# Patient Record
Sex: FEMALE | Race: WHITE | NOT HISPANIC OR LATINO | Employment: OTHER | URBAN - METROPOLITAN AREA
[De-identification: names, ages, dates, MRNs, and addresses within clinical notes are randomized per-mention and may not be internally consistent; named-entity substitution may affect disease eponyms.]

---

## 2017-01-18 ENCOUNTER — OFFICE VISIT (OUTPATIENT)
Dept: URGENT CARE | Facility: CLINIC | Age: 57
End: 2017-01-18
Payer: COMMERCIAL

## 2017-01-18 VITALS
TEMPERATURE: 98.1 F | WEIGHT: 190 LBS | BODY MASS INDEX: 32.44 KG/M2 | RESPIRATION RATE: 17 BRPM | DIASTOLIC BLOOD PRESSURE: 82 MMHG | SYSTOLIC BLOOD PRESSURE: 110 MMHG | HEART RATE: 95 BPM | OXYGEN SATURATION: 96 % | HEIGHT: 64 IN

## 2017-01-18 DIAGNOSIS — R05.9 COUGH: ICD-10-CM

## 2017-01-18 PROCEDURE — 99213 OFFICE O/P EST LOW 20 MIN: CPT | Performed by: EMERGENCY MEDICINE

## 2017-01-18 RX ORDER — DOXYCYCLINE HYCLATE 100 MG
100 TABLET ORAL 2 TIMES DAILY
Qty: 20 TAB | Refills: 0 | Status: SHIPPED | OUTPATIENT
Start: 2017-01-18 | End: 2017-03-12

## 2017-01-18 RX ORDER — CODEINE PHOSPHATE AND GUAIFENESIN 10; 100 MG/5ML; MG/5ML
5 SOLUTION ORAL EVERY 4 HOURS PRN
Qty: 200 ML | Refills: 0 | Status: SHIPPED | OUTPATIENT
Start: 2017-01-18 | End: 2017-03-12

## 2017-01-18 ASSESSMENT — ENCOUNTER SYMPTOMS
ABDOMINAL PAIN: 0
NAUSEA: 0
SPUTUM PRODUCTION: 0
COUGH: 1
NECK PAIN: 0
SENSORY CHANGE: 0
VOMITING: 0
CHILLS: 0
HEARTBURN: 0
FEVER: 0
SPEECH CHANGE: 0
SWEATS: 0
HEMOPTYSIS: 0
EYE REDNESS: 0
SHORTNESS OF BREATH: 0
EYE DISCHARGE: 0
EYE PAIN: 0
PALPITATIONS: 0
SORE THROAT: 0
MYALGIAS: 0
NERVOUS/ANXIOUS: 0

## 2017-01-18 NOTE — PROGRESS NOTES
Subjective:      Dinora Umana is a 56 y.o. female who presents with URI            Cough  This is a new problem. The current episode started more than 1 month ago. The problem has been waxing and waning. The problem occurs hourly. The cough is non-productive. Pertinent negatives include no chest pain, chills, ear pain, eye redness, fever, heartburn, hemoptysis, myalgias, nasal congestion, rash, sore throat, shortness of breath or sweats. Exacerbated by: worsen during the night. She has tried OTC cough suppressant for the symptoms. The treatment provided no relief. There is no history of asthma. patient recently stopped smoking about a month ago.     Allergies   Allergen Reactions   • Augmentin Hives and Rash   • Morphine Vomiting and Nausea     Social History     Social History   • Marital Status:      Spouse Name: N/A   • Number of Children: N/A   • Years of Education: N/A     Occupational History   • Not on file.     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 16 g 3   • zolpidem (AMBIEN CR) 12.5 MG CR tablet Take 1 Tab by mouth at bedtime as needed for Sleep. 30 Tab 2   • lorazepam (ATIVAN) 1 MG Tab Take 1 Tab by mouth 2 times a day as needed for Anxiety. 45 Tab 5   • fluoxetine  "(PROZAC) 40 MG capsule Take 1 Cap by mouth every day. 90 Cap 3   • Multiple Vitamins-Minerals (MULTIVITAMIN PO) Take  by mouth every day.       No current facility-administered medications on file prior to visit.     Family History   Problem Relation Age of Onset   • Stroke     • Cancer         Review of Systems   Constitutional: Negative for fever and chills.   HENT: Negative for ear pain and sore throat.    Eyes: Negative for pain, discharge and redness.   Respiratory: Positive for cough. Negative for hemoptysis, sputum production and shortness of breath.    Cardiovascular: Negative for chest pain and palpitations.   Gastrointestinal: Negative for heartburn, nausea, vomiting and abdominal pain.   Musculoskeletal: Negative for myalgias and neck pain.   Skin: Negative for rash.   Neurological: Negative for sensory change and speech change.   Psychiatric/Behavioral: The patient is not nervous/anxious.           Objective:     /82 mmHg  Pulse 95  Temp(Src) 36.7 °C (98.1 °F)  Resp 17  Ht 1.626 m (5' 4.02\")  Wt 86.183 kg (190 lb)  BMI 32.60 kg/m2  SpO2 96%     Physical Exam   Constitutional: She appears well-developed and well-nourished. No distress.   HENT:   Head: Normocephalic and atraumatic.   Right Ear: External ear normal.   Left Ear: External ear normal.   Eyes: Conjunctivae are normal. Right eye exhibits no discharge. Left eye exhibits no discharge.   Neck: Normal range of motion. Neck supple. No tracheal deviation present. No thyromegaly present.   Cardiovascular: Normal rate and regular rhythm.    Pulmonary/Chest: Effort normal. No respiratory distress. She has no wheezes. She has no rales. She exhibits no tenderness.   Abdominal: She exhibits no distension. There is no tenderness.   Musculoskeletal: Normal range of motion. She exhibits no edema or tenderness.   Neurological: No cranial nerve deficit. Coordination normal.   Skin: Skin is warm and dry. She is not diaphoretic.   Psychiatric: She has " a normal mood and affect. Her behavior is normal.   Vitals reviewed.              Assessment/Plan:     Diagnosis acute bronchitis      I am recommending the patient initiate/ continue hydration efforts including the use of a vaporizer/humidifier/ netti pot. I also recommend the pt, initiate Mucinex, Sudafed or Dayquil if not hypertensive. In addition the patient will initiate the prescribed prescription medication/s: Vibramycin. And Z-Calvin on a contingency basis. If the patient's condition exacerbates with worsening dysphagia, shortness of breath, uncontrolled fever, headache or chest pressure he/she will return immediately to the urgent care or go to  the emergency department for further evaluation.  She will start the Vibramycin sputum becomes purulent.  SMITA Kinney

## 2017-01-18 NOTE — Clinical Note
January 18, 2017        Dinora Umana  [address] [place] [place] [address]        Dear Dinora:    Please ask to be allowed to stay home from  work for the next two days,    If you have any questions or concerns, please don't hesitate to call.        Sincerely,        SMITA Kinney M.D.    Electronically Signed

## 2017-01-20 ENCOUNTER — OFFICE VISIT (OUTPATIENT)
Dept: URGENT CARE | Facility: CLINIC | Age: 57
End: 2017-01-20
Payer: COMMERCIAL

## 2017-01-20 VITALS
OXYGEN SATURATION: 98 % | HEIGHT: 64 IN | RESPIRATION RATE: 15 BRPM | DIASTOLIC BLOOD PRESSURE: 90 MMHG | SYSTOLIC BLOOD PRESSURE: 120 MMHG | BODY MASS INDEX: 32.44 KG/M2 | HEART RATE: 84 BPM | TEMPERATURE: 97.4 F | WEIGHT: 190 LBS

## 2017-01-20 DIAGNOSIS — J40 BRONCHITIS: Primary | ICD-10-CM

## 2017-01-20 DIAGNOSIS — R11.0 NAUSEA: ICD-10-CM

## 2017-01-20 DIAGNOSIS — J01.40 ACUTE NON-RECURRENT PANSINUSITIS: ICD-10-CM

## 2017-01-20 DIAGNOSIS — J06.9 URI WITH COUGH AND CONGESTION: ICD-10-CM

## 2017-01-20 PROCEDURE — 99214 OFFICE O/P EST MOD 30 MIN: CPT | Performed by: PHYSICIAN ASSISTANT

## 2017-01-20 RX ORDER — PROMETHAZINE HYDROCHLORIDE 25 MG/1
25 TABLET ORAL EVERY 6 HOURS PRN
Qty: 30 TAB | Refills: 0 | Status: SHIPPED | OUTPATIENT
Start: 2017-01-20 | End: 2018-02-23

## 2017-01-20 RX ORDER — BENZONATATE 200 MG/1
200 CAPSULE ORAL 3 TIMES DAILY PRN
Qty: 21 CAP | Refills: 0 | Status: SHIPPED | OUTPATIENT
Start: 2017-01-20 | End: 2017-01-27

## 2017-01-20 RX ORDER — SULFAMETHOXAZOLE AND TRIMETHOPRIM 800; 160 MG/1; MG/1
1 TABLET ORAL 2 TIMES DAILY
Qty: 20 TAB | Refills: 0 | Status: SHIPPED | OUTPATIENT
Start: 2017-01-20 | End: 2017-01-30

## 2017-01-20 ASSESSMENT — COPD QUESTIONNAIRES: COPD: 0

## 2017-01-20 ASSESSMENT — ENCOUNTER SYMPTOMS: COUGH: 1

## 2017-01-20 NOTE — PATIENT INSTRUCTIONS
Acute Bronchitis  Bronchitis is inflammation of the airways that extend from the windpipe into the lungs (bronchi). The inflammation often causes mucus to develop. This leads to a cough, which is the most common symptom of bronchitis.   In acute bronchitis, the condition usually develops suddenly and goes away over time, usually in a couple weeks. Smoking, allergies, and asthma can make bronchitis worse. Repeated episodes of bronchitis may cause further lung problems.   CAUSES  Acute bronchitis is most often caused by the same virus that causes a cold. The virus can spread from person to person (contagious) through coughing, sneezing, and touching contaminated objects.  SIGNS AND SYMPTOMS   · Cough.    · Fever.    · Coughing up mucus.    · Body aches.    · Chest congestion.    · Chills.    · Shortness of breath.    · Sore throat.    DIAGNOSIS   Acute bronchitis is usually diagnosed through a physical exam. Your health care provider will also ask you questions about your medical history. Tests, such as chest X-rays, are sometimes done to rule out other conditions.   TREATMENT   Acute bronchitis usually goes away in a couple weeks. Oftentimes, no medical treatment is necessary. Medicines are sometimes given for relief of fever or cough. Antibiotic medicines are usually not needed but may be prescribed in certain situations. In some cases, an inhaler may be recommended to help reduce shortness of breath and control the cough. A cool mist vaporizer may also be used to help thin bronchial secretions and make it easier to clear the chest.   HOME CARE INSTRUCTIONS  · Get plenty of rest.    · Drink enough fluids to keep your urine clear or pale yellow (unless you have a medical condition that requires fluid restriction). Increasing fluids may help thin your respiratory secretions (sputum) and reduce chest congestion, and it will prevent dehydration.    · Take medicines only as directed by your health care provider.  · If  you were prescribed an antibiotic medicine, finish it all even if you start to feel better.  · Avoid smoking and secondhand smoke. Exposure to cigarette smoke or irritating chemicals will make bronchitis worse. If you are a smoker, consider using nicotine gum or skin patches to help control withdrawal symptoms. Quitting smoking will help your lungs heal faster.    · Reduce the chances of another bout of acute bronchitis by washing your hands frequently, avoiding people with cold symptoms, and trying not to touch your hands to your mouth, nose, or eyes.    · Keep all follow-up visits as directed by your health care provider.    SEEK MEDICAL CARE IF:  Your symptoms do not improve after 1 week of treatment.   SEEK IMMEDIATE MEDICAL CARE IF:  · You develop an increased fever or chills.    · You have chest pain.    · You have severe shortness of breath.  · You have bloody sputum.    · You develop dehydration.  · You faint or repeatedly feel like you are going to pass out.  · You develop repeated vomiting.  · You develop a severe headache.  MAKE SURE YOU:   · Understand these instructions.  · Will watch your condition.  · Will get help right away if you are not doing well or get worse.     This information is not intended to replace advice given to you by your health care provider. Make sure you discuss any questions you have with your health care provider.     Document Released: 01/25/2006 Document Revised: 01/08/2016 Document Reviewed: 06/10/2014  [employer] Interactive Patient Education ©2016 [employer]

## 2017-01-20 NOTE — MR AVS SNAPSHOT
"Dinora Umana   2017 1:25 PM   Office Visit   MRN: [id number]    Department:  [facility]   Dept Phone:  [phone]    Description:  Female : 1960   Provider:  Dao Main PA-C           Reason for Visit     Cough           Allergies as of 2017     Allergen Noted Reactions    Augmentin 2008   Hives, Rash    Erythromycin 2017   Nausea    Morphine 2015   Vomiting, Nausea      You were diagnosed with     Bronchitis   [896081]  -  Primary     Acute non-recurrent pansinusitis   [[id number]]       URI with cough and congestion   [[id number]]       Nausea   [457556]         Vital Signs     Blood Pressure Pulse Temperature Respirations Height Weight    120/90 mmHg 84 36.3 °C (97.4 °F) 15 1.626 m (5' 4\") 86.183 kg (190 lb)    Body Mass Index Oxygen Saturation Smoking Status             32.60 kg/m2 98% Former Smoker         Basic Information     Date Of Birth Sex Race Ethnicity Preferred Language    1960 Female White Non- English      Problem List              ICD-10-CM Priority Class Noted - Resolved    MDD (major depressive disorder), recurrent episode, mild (CMS-[facility]) F33.0   Unknown - Present    Insomnia G47.00   Unknown - Present    Numbness and tingling in left hand R20.2   2014 - Present    Anxiety F41.9   2014 - Present    Recovering alcoholic in remission (CMS-[facility]) F10.21   2014 - Present    Seasonal allergies J30.2   2014 - Present    Hyperlipidemia E78.5   10/29/2014 - Present    Diverticulosis of colon K57.30   2014 - Present    Bilateral hearing loss H91.93   2015 - Present    Obesity (BMI 30-39.9) E66.9   2016 - Present      Health Maintenance        Date Due Completion Dates    MAMMOGRAM 2015, 2013, 2011, 2010, 2010, 2006    PAP SMEAR 2016, 2011    IMM DTaP/Tdap/Td Vaccine (2 - Td) 10/29/2024 10/29/2014, 2010    COLONOSCOPY 2024      "      Current Immunizations     Influenza TIV (IM) 1/21/2014, 11/23/2011    Influenza Vaccine Quad Inj (Preserved) 11/11/2016, 10/29/2014  8:47 AM    TD Vaccine 11/1/2010  8:30 PM    Tdap Vaccine 10/29/2014  9:13 AM      Below and/or attached are the medications your provider expects you to take. Review all of your home medications and newly ordered medications with your provider and/or pharmacist. Follow medication instructions as directed by your provider and/or pharmacist. Please keep your medication list with you and share with your provider. Update the information when medications are discontinued, doses are changed, or new medications (including over-the-counter products) are added; and carry medication information at all times in the event of emergency situations     Allergies:  AUGMENTIN - Hives,Rash     ERYTHROMYCIN - Nausea     MORPHINE - Vomiting,Nausea               Medications  Valid as of: January 20, 2017 -  5:34 PM    Generic Name Brand Name Tablet Size Instructions for use    Benzonatate (Cap) TESSALON 200 MG Take 1 Cap by mouth 3 times a day as needed for Cough for up to 7 days.        Doxycycline Hyclate (Tab) VIBRAMYCIN 100 MG Take 1 Tab by mouth 2 times a day.        Doxycycline Hyclate (Tab) VIBRAMYCIN 100 MG Take 1 Tab by mouth 2 times a day.        FLUoxetine HCl (Cap) PROZAC 40 MG Take 1 Cap by mouth every day.        Fluticasone Propionate (Suspension) FLONASE 50 MCG/ACT Spray 2 Sprays in nose every day.        Guaifenesin-Codeine (Solution) ROBITUSSIN -10 mg/5mL Take 5 mL by mouth every four hours as needed.        LORazepam (Tab) ATIVAN 1 MG Take 1 Tab by mouth 2 times a day as needed for Anxiety.        Multiple Vitamins-Minerals   Take  by mouth every day.        Promethazine HCl (Tab) PHENERGAN 25 MG Take 1 Tab by mouth every 6 hours as needed for Nausea/Vomiting.        Sulfamethoxazole-Trimethoprim (Tab) BACTRIM -160 MG Take 1 Tab by mouth 2 times a day for 10 days.         Zolpidem Tartrate (Tab CR) AMBIEN CR 12.5 MG Take 1 Tab by mouth at bedtime as needed for Sleep.        .                 Medicines prescribed today were sent to:     [id number] [place] [facility] [place] - [address]    [address] [place] [address]    Phone: [phone] Fax: [phone]    Open 24 Hours?: No      Medication refill instructions:       If your prescription bottle indicates you have medication refills left, it is not necessary to call your provider’s office. Please contact your pharmacy and they will refill your medication.    If your prescription bottle indicates you do not have any refills left, you may request refills at any time through one of the following ways: The online [employer] system (except Urgent Care), by calling your provider’s office, or by asking your pharmacy to contact your provider’s office with a refill request. Medication refills are processed only during regular business hours and may not be available until the next business day. Your provider may request additional information or to have a follow-up visit with you prior to refilling your medication.   *Please Note: Medication refills are assigned a new Rx number when refilled electronically. Your pharmacy may indicate that no refills were authorized even though a new prescription for the same medication is available at the pharmacy. Please request the medicine by name with the pharmacy before contacting your provider for a refill.        Instructions    Acute Bronchitis  Bronchitis is inflammation of the airways that extend from the windpipe into the lungs (bronchi). The inflammation often causes mucus to develop. This leads to a cough, which is the most common symptom of bronchitis.   In acute bronchitis, the condition usually develops suddenly and goes away over time, usually in a couple weeks. Smoking, allergies, and asthma can make bronchitis worse. Repeated episodes of bronchitis may cause further lung  problems.   CAUSES  Acute bronchitis is most often caused by the same virus that causes a cold. The virus can spread from person to person (contagious) through coughing, sneezing, and touching contaminated objects.  SIGNS AND SYMPTOMS   · Cough.    · Fever.    · Coughing up mucus.    · Body aches.    · Chest congestion.    · Chills.    · Shortness of breath.    · Sore throat.    DIAGNOSIS   Acute bronchitis is usually diagnosed through a physical exam. Your health care provider will also ask you questions about your medical history. Tests, such as chest X-rays, are sometimes done to rule out other conditions.   TREATMENT   Acute bronchitis usually goes away in a couple weeks. Oftentimes, no medical treatment is necessary. Medicines are sometimes given for relief of fever or cough. Antibiotic medicines are usually not needed but may be prescribed in certain situations. In some cases, an inhaler may be recommended to help reduce shortness of breath and control the cough. A cool mist vaporizer may also be used to help thin bronchial secretions and make it easier to clear the chest.   HOME CARE INSTRUCTIONS  · Get plenty of rest.    · Drink enough fluids to keep your urine clear or pale yellow (unless you have a medical condition that requires fluid restriction). Increasing fluids may help thin your respiratory secretions (sputum) and reduce chest congestion, and it will prevent dehydration.    · Take medicines only as directed by your health care provider.  · If you were prescribed an antibiotic medicine, finish it all even if you start to feel better.  · Avoid smoking and secondhand smoke. Exposure to cigarette smoke or irritating chemicals will make bronchitis worse. If you are a smoker, consider using nicotine gum or skin patches to help control withdrawal symptoms. Quitting smoking will help your lungs heal faster.    · Reduce the chances of another bout of acute bronchitis by washing your hands frequently,  avoiding people with cold symptoms, and trying not to touch your hands to your mouth, nose, or eyes.    · Keep all follow-up visits as directed by your health care provider.    SEEK MEDICAL CARE IF:  Your symptoms do not improve after 1 week of treatment.   SEEK IMMEDIATE MEDICAL CARE IF:  · You develop an increased fever or chills.    · You have chest pain.    · You have severe shortness of breath.  · You have bloody sputum.    · You develop dehydration.  · You faint or repeatedly feel like you are going to pass out.  · You develop repeated vomiting.  · You develop a severe headache.  MAKE SURE YOU:   · Understand these instructions.  · Will watch your condition.  · Will get help right away if you are not doing well or get worse.     This information is not intended to replace advice given to you by your health care provider. Make sure you discuss any questions you have with your health care provider.     Document Released: 01/25/2006 Document Revised: 01/08/2016 Document Reviewed: 06/10/2014  [employer] Interactive Patient Education ©2016 [employer] Inc.            ValetAnywhere Access Code: [email or web address]  Expires: 2/10/2017  3:34 PM    ValetAnywhere  A secure, online tool to manage your health information     [employer]’s ValetAnywhere® is a secure, online tool that connects you to your personalized health information from the privacy of your home -- day or night - making it very easy for you to manage your healthcare. Once the activation process is completed, you can even access your medical information using the ValetAnywhere joshua, which is available for free in the Apple Joshua store or Google Play store.     ValetAnywhere provides the following levels of access (as shown below):   My Chart Features   [facility] Doctor [facility]  Specialists [facility]  Urgent  Care [facility]  Primary Care  Doctor   Email your healthcare team securely and privately 24/7 X X X    Manage appointments: schedule your next appointment; view details of  past/upcoming appointments X      Request prescription refills. X      View recent personal medical records, including lab and immunizations X X X X   View health record, including health history, allergies, medications X X X X   Read reports about your outpatient visits, procedures, consult and ER notes X X X X   See your discharge summary, which is a recap of your hospital and/or ER visit that includes your diagnosis, lab results, and care plan. X X       How to register for [employer]:  1. Go to  [email or web address].  2. Click on the Sign Up Now box, which takes you to the New Member Sign Up page. You will need to provide the following information:  a. Enter your [employer] Access Code exactly as it appears at the top of this page. (You will not need to use this code after you’ve completed the sign-up process. If you do not sign up before the expiration date, you must request a new code.)   b. Enter your date of birth.   c. Enter your home email address.   d. Click Submit, and follow the next screen’s instructions.  3. Create a [employer] ID. This will be your [employer] login ID and cannot be changed, so think of one that is secure and easy to remember.  4. Create a [employer] password. You can change your password at any time.  5. Enter your Password Reset Question and Answer. This can be used at a later time if you forget your password.   6. Enter your e-mail address. This allows you to receive e-mail notifications when new information is available in [employer].  7. Click Sign Up. You can now view your health information.    For assistance activating your [employer] account, call [phone]

## 2017-01-20 NOTE — Clinical Note
January 20, 2017       Patient: Dinora Umana   YOB: 1960   Date of Visit: 1/20/2017         To Whom It May Concern:    It is my medical opinion that Dinora Umana may be excused from   work for the date of 1/20/17.      If you have any questions or concerns, please don't hesitate to call [phone]          Sincerely,          Dao Main PA-C  Electronically Signed

## 2017-01-21 NOTE — PROGRESS NOTES
Subjective:      Dinora Umana is a 56 y.o. female who presents with Cough            Cough  This is a new problem. The current episode started in the past 7 days. The problem has been gradually worsening. The problem occurs constantly. The cough is productive of purulent sputum. The symptoms are aggravated by cold air, exercise and lying down. She has tried OTC cough suppressant for the symptoms. The treatment provided no relief. Her past medical history is significant for bronchitis. There is no history of asthma, bronchiectasis, COPD or emphysema.   PT presents to  clinic today complaining of sore throat, fevers, chills, watery eyes, pressure in ears, cough, fatigue, runny nose, wheezing and SOB and mild nausea. PT denies CP, diarrhea, abdominal pain, joint pain. PT states these symptoms began around 7 days ago and that the pt's family has been sick on and off for the last week. PT states the pain is a 7/10 with coughing, aching in nature and worse at night. Pt has not taken any medications for this condition. The pt's medication list, problem list, and allergies have been evaluated and reviewed during today's visit.    PMH:  Past Medical History   Diagnosis Date   • Insomnia    • Allergy    • Depression    • Ex-smoker    • Alcohol abuse    • Left wrist fracture 2013   • Thyrotoxicosis      Graves' disease / TR ab +, resolved       PSH:  Past Surgical History   Procedure Laterality Date   • Other       eye surgery   • Primary c section  1999, 2000   • Wrist orif  8/1/2013     Left Performed by Madina Mccartney M.D. at [facility]   • Mammoplasty reduction Bilateral 9/29/2015     Procedure: MAMMOPLASTY REDUCTION;  Surgeon: Sharon Srinivasan M.D.;  Location: [facility];  Service:        [facility] Hx:    family history includes Cancer in an other family member; Stroke in an other family member.  Family Status   Relation Status Death Age   • Mother Alive    • Father Alive        Soc  HX:  Social History     Social History   • Marital Status:      Spouse Name: N/A   • Number of Children: N/A   • Years of Education: N/A     Occupational History   • Not on file.     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, teaching     Other Topics Concern   • Not on file     Social History Narrative         Medications:    Current outpatient prescriptions:   •  sulfamethoxazole-trimethoprim (BACTRIM DS) 800-160 MG tablet, Take 1 Tab by mouth 2 times a day for 10 days., Disp: 20 Tab, Rfl: 0  •  benzonatate (TESSALON) 200 MG capsule, Take 1 Cap by mouth 3 times a day as needed for Cough for up to 7 days., Disp: 21 Cap, Rfl: 0  •  promethazine (PHENERGAN) 25 MG Tab, Take 1 Tab by mouth every 6 hours as needed for Nausea/Vomiting., Disp: 30 Tab, Rfl: 0  •  zolpidem (AMBIEN CR) 12.5 MG CR tablet, Take 1 Tab by mouth at bedtime as needed for Sleep., Disp: 30 Tab, Rfl: 2  •  lorazepam (ATIVAN) 1 MG Tab, Take 1 Tab by mouth 2 times a day as needed for Anxiety., Disp: 45 Tab, Rfl: 5  •  fluoxetine (PROZAC) 40 MG capsule, Take 1 Cap by mouth every day., Disp: 90 Cap, Rfl: 3  •  Multiple Vitamins-Minerals (MULTIVITAMIN PO), Take  by mouth every day., Disp: , Rfl:   •  guaifenesin-codeine (CHERATUSSIN AC) Solution oral solution, Take 5 mL by mouth every four hours as needed., Disp: 200 mL, Rfl: 0  •  doxycycline (VIBRAMYCIN) 100 MG Tab, Take 1 Tab by mouth 2 times a day., Disp: 20 Tab, Rfl: 0  •  doxycycline (VIBRAMYCIN) 100 MG Tab, Take 1 Tab by mouth 2 times a day., Disp: 20 Tab, Rfl: 0  •  fluticasone (FLONASE) 50 MCG/ACT nasal spray, Spray 2 Sprays in nose every day., Disp: 16 g, Rfl: 3      Allergies:  Augmentin; Erythromycin; and  "Morphine        Review of Systems   Respiratory: Positive for cough.    Review of Systems   Constitutional: Positive for chills and malaise/fatigue. Negative for fever and diaphoresis.   HENT: Positive for congestion, ear pain and sore throat. Negative for ear discharge, hearing loss, nosebleeds and tinnitus.    Eyes: Negative for blurred vision, double vision and photophobia.   Respiratory continued: Positive for cough, sputum production, shortness of breath and wheezing. Negative for hemoptysis.    Cardiovascular: Negative for chest pain and palpitations.   Gastrointestinal: POS for nausea, NEG for vomiting, abdominal pain, diarrhea and constipation.   Genitourinary: Negative for dysuria and flank pain.   Musculoskeletal: Negative for joint pain and myalgias.   Skin: Negative for itching and rash.   Neurological: Positive for headaches. Negative for dizziness, tingling and weakness.   Endo/Heme/Allergies: Does not bruise/bleed easily.   Psychiatric/Behavioral: Negative for depression. The patient is not nervous/anxious.    All other systems reviewed and are negative.           Objective:     /90 mmHg  Pulse 84  Temp(Src) 36.3 °C (97.4 °F)  Resp 15  Ht 1.626 m (5' 4\")  Wt 86.183 kg (190 lb)  BMI 32.60 kg/m2  SpO2 98%     Physical Exam      Physical Exam   Constitutional: PT is oriented to person, place, and time. PT appears well-developed and well-nourished. No distress.   HENT:   Head: Normocephalic and atraumatic.   Right Ear: Hearing, tympanic membrane, external ear and ear canal normal.   Left Ear: Hearing, tympanic membrane, external ear and ear canal normal.   Nose: Mucosal edema, rhinorrhea and sinus tenderness present. Right sinus exhibits frontal sinus tenderness. Left sinus exhibits frontal sinus tenderness.   Mouth/Throat: Uvula is midline. Mucous membranes are pale. Posterior oropharyngeal edema and posterior oropharyngeal erythema present. No oropharyngeal exudate.   Eyes: Conjunctivae " normal and EOM are normal. Pupils are equal, round, and reactive to light. Right eye exhibits no discharge. Left eye exhibits no discharge.   Neck: Normal range of motion. Neck supple. No thyromegaly present.   Cardiovascular: Normal rate, regular rhythm, normal heart sounds and intact distal pulses.  Exam reveals no gallop and no friction rub.    No murmur heard.  Pulmonary/Chest: Effort normal. No respiratory distress. PT has wheezes. PT has no rales. PT exhibits tenderness.   Abdominal: Soft. Bowel sounds are normal. PT exhibits no distension and no mass. There is no tenderness. There is no rebound and no guarding.   Musculoskeletal: Normal range of motion. PT exhibits no edema and no tenderness.   Lymphadenopathy:     PT has no cervical adenopathy.   Neurological: Pt is alert and oriented to person, place, and time. Pt has normal reflexes. No cranial nerve deficit.   Skin: Skin is warm and dry. No rash noted. No erythema.   Psychiatric: PT has a normal mood and affect. Pt behavior is normal. Judgment and thought content normal.          Assessment/Plan:     1. Bronchitis    - sulfamethoxazole-trimethoprim (BACTRIM DS) 800-160 MG tablet; Take 1 Tab by mouth 2 times a day for 10 days.  Dispense: 20 Tab; Refill: 0    2. Acute non-recurrent pansinusitis    - sulfamethoxazole-trimethoprim (BACTRIM DS) 800-160 MG tablet; Take 1 Tab by mouth 2 times a day for 10 days.  Dispense: 20 Tab; Refill: 0    3. URI with cough and congestion    - benzonatate (TESSALON) 200 MG capsule; Take 1 Cap by mouth 3 times a day as needed for Cough for up to 7 days.  Dispense: 21 Cap; Refill: 0    4. Nausea    - promethazine (PHENERGAN) 25 MG Tab; Take 1 Tab by mouth every 6 hours as needed for Nausea/Vomiting.  Dispense: 30 Tab; Refill: 0    Rest, fluids encouraged.  OTC decongestant for congestion/cough  Note given for work.  AVS with medical info given.  Pt was in full understanding and agreement with the plan.  Follow-up as needed if  symptoms worsen or fail to improve.

## 2017-02-08 ENCOUNTER — RX ONLY (OUTPATIENT)
Age: 57
Setting detail: RX ONLY
End: 2017-02-08

## 2017-02-22 ENCOUNTER — OFFICE VISIT (OUTPATIENT)
Dept: URGENT CARE | Facility: CLINIC | Age: 57
End: 2017-02-22
Payer: COMMERCIAL

## 2017-02-22 VITALS
TEMPERATURE: 97.5 F | RESPIRATION RATE: 20 BRPM | OXYGEN SATURATION: 96 % | HEART RATE: 100 BPM | BODY MASS INDEX: 32.6 KG/M2 | SYSTOLIC BLOOD PRESSURE: 120 MMHG | DIASTOLIC BLOOD PRESSURE: 80 MMHG | WEIGHT: 190 LBS

## 2017-02-22 DIAGNOSIS — M25.521 RIGHT ELBOW PAIN: ICD-10-CM

## 2017-02-22 PROCEDURE — 99214 OFFICE O/P EST MOD 30 MIN: CPT | Performed by: PHYSICIAN ASSISTANT

## 2017-02-22 RX ORDER — MELOXICAM 15 MG/1
15 TABLET ORAL DAILY
Qty: 30 TAB | Refills: 0 | Status: SHIPPED | OUTPATIENT
Start: 2017-02-22 | End: 2018-02-23

## 2017-02-22 ASSESSMENT — ENCOUNTER SYMPTOMS
SENSORY CHANGE: 0
SHORTNESS OF BREATH: 0
CHILLS: 0
PALPITATIONS: 0
PAIN: 1
FALLS: 0
TINGLING: 0
JOINT SWELLING: 1
WHEEZING: 0
FEVER: 0
COUGH: 0

## 2017-02-22 NOTE — PROGRESS NOTES
Subjective:      Dinora Umana is a 56 y.o. female who presents with Pain            Pain  This is a new problem. The current episode started more than 1 month ago. The problem occurs daily. The problem has been waxing and waning. Associated symptoms include joint swelling. Pertinent negatives include no chest pain, chills, coughing or fever. She has tried nothing for the symptoms.   Right elbow pain off and on for the past several months. She denies any precipitating events or injuries. Patient states she is ambidextrous. She has not taken anything for the condition. She states it is worse with grasping and elbow flexion.    PMH:  has a past medical history of Insomnia; Allergy; Depression; Ex-smoker; Alcohol abuse; Left wrist fracture (2013); and Thyrotoxicosis.  MEDS:   Current outpatient prescriptions:   •  fluoxetine (PROZAC) 40 MG capsule, Take 1 Cap by mouth every day., Disp: 90 Cap, Rfl: 3  •  Multiple Vitamins-Minerals (MULTIVITAMIN PO), Take  by mouth every day., Disp: , Rfl:   •  promethazine (PHENERGAN) 25 MG Tab, Take 1 Tab by mouth every 6 hours as needed for Nausea/Vomiting., Disp: 30 Tab, Rfl: 0  •  guaifenesin-codeine (CHERATUSSIN AC) Solution oral solution, Take 5 mL by mouth every four hours as needed., Disp: 200 mL, Rfl: 0  •  doxycycline (VIBRAMYCIN) 100 MG Tab, Take 1 Tab by mouth 2 times a day., Disp: 20 Tab, Rfl: 0  •  doxycycline (VIBRAMYCIN) 100 MG Tab, Take 1 Tab by mouth 2 times a day., Disp: 20 Tab, Rfl: 0  •  fluticasone (FLONASE) 50 MCG/ACT nasal spray, Spray 2 Sprays in nose every day., Disp: 16 g, Rfl: 3  •  zolpidem (AMBIEN CR) 12.5 MG CR tablet, Take 1 Tab by mouth at bedtime as needed for Sleep., Disp: 30 Tab, Rfl: 2  •  lorazepam (ATIVAN) 1 MG Tab, Take 1 Tab by mouth 2 times a day as needed for Anxiety., Disp: 45 Tab, Rfl: 5  ALLERGIES:   Allergies   Allergen Reactions   • Augmentin Hives and Rash   • Erythromycin Nausea   • Morphine Vomiting and Nausea     SURGHX:   Past  Surgical History   Procedure Laterality Date   • Other       eye surgery   • Primary c section  1999, 2000   • Wrist orif  8/1/2013     Left Performed by Madina Mccartney M.D. at [facility]   • Mammoplasty reduction Bilateral 9/29/2015     Procedure: MAMMOPLASTY REDUCTION;  Surgeon: Sharon Srinivasan M.D.;  Location: [facility]ERY [facility];  Service:      SOCHX:  reports that she quit smoking about 17 months ago. She has never used smokeless tobacco. She reports that she drinks alcohol. She reports that she does not use illicit drugs.  FH: family history includes Cancer in an other family member; Stroke in an other family member.    Review of Systems   Constitutional: Negative for fever, chills and malaise/fatigue.   Respiratory: Negative for cough, shortness of breath and wheezing.    Cardiovascular: Negative for chest pain, palpitations and leg swelling.   Musculoskeletal: Positive for joint pain (Right elbow) and joint swelling. Negative for falls.   Neurological: Negative for tingling and sensory change.       Medications, Allergies, and current problem list reviewed today in Epic       Objective:     /80 mmHg  Pulse 100  Temp(Src) 36.4 °C (97.5 °F)  Resp 20  Wt 86.183 kg (190 lb)  SpO2 96%     Physical Exam   Constitutional: She is oriented to person, place, and time. She appears well-developed and well-nourished. No distress.   HENT:   Head: Normocephalic and atraumatic.   Eyes: Conjunctivae and EOM are normal.   Neck: Normal range of motion. Neck supple.   Cardiovascular: Normal rate, regular rhythm and normal heart sounds.    Pulmonary/Chest: Effort normal and breath sounds normal. No respiratory distress. She has no wheezes.   Musculoskeletal:        Right elbow: She exhibits swelling. She exhibits normal range of motion. Tenderness found. Medial epicondyle and lateral epicondyle tenderness noted.    strength equal however painful and right forearm muscles.  Neurovascularly intact.   Neurological: She is alert and oriented to person, place, and time.   Skin: Skin is warm and dry. She is not diaphoretic.   Psychiatric: She has a normal mood and affect. Her behavior is normal. Judgment and thought content normal.   Nursing note and vitals reviewed.              Assessment/Plan:     1. Right elbow pain  meloxicam (MOBIC) 15 MG tablet    REFERRAL TO ORTHOPEDICS     Chronic right elbow pain. Possible lateral epicondylitis.  Trial of meloxicam  Rest, ice, compression, elevation  If no improvement in 2-3 weeks follow-up with orthopedics. Referral placed  Return to clinic or go to ED if symptoms worsen or persist. Indications for ED discussed at length. Patient voices understanding. Follow-up with your primary care provider in 3-5 days. Red flags discussed.    Please note that this dictation was created using voice recognition software. I have made every reasonable attempt to correct obvious errors, but I expect that there are errors of grammar and possibly content that I did not discover before finalizing the note.

## 2017-02-22 NOTE — MR AVS SNAPSHOT
Dinora Preciadoer   2017 12:00 PM   Office Visit   MRN: [id number]    Department:  [facility]   Dept Phone:  [phone]    Description:  Female : 1960   Provider:  Nigel Thomas PA-C           Reason for Visit     Pain While ago left elbow pain .      Allergies as of 2017     Allergen Noted Reactions    Augmentin 2008   Hives, Rash    Erythromycin 2017   Nausea    Morphine 2015   Vomiting, Nausea      You were diagnosed with     Right elbow pain   [[id number]]         Vital Signs     Blood Pressure Pulse Temperature Respirations Weight Oxygen Saturation    120/80 mmHg 100 36.4 °C (97.5 °F) 20 86.183 kg (190 lb) 96%    Smoking Status                   Former Smoker           Basic Information     Date Of Birth Sex Race Ethnicity Preferred Language    1960 Female White Non- English      Problem List              ICD-10-CM Priority Class Noted - Resolved    MDD (major depressive disorder), recurrent episode, mild (CMS-[facility]) F33.0   Unknown - Present    Insomnia G47.00   Unknown - Present    Numbness and tingling in left hand R20.2   2014 - Present    Anxiety F41.9   2014 - Present    Recovering alcoholic in remission (CMS-[facility]) F10.21   2014 - Present    Seasonal allergies J30.2   2014 - Present    Hyperlipidemia E78.5   10/29/2014 - Present    Diverticulosis of colon K57.30   2014 - Present    Bilateral hearing loss H91.93   2015 - Present    Obesity (BMI 30-39.9) E66.9   2016 - Present      Health Maintenance        Date Due Completion Dates    MAMMOGRAM 2015, 2013, 2011, 2010, 2010, 2006    PAP SMEAR 2016, 2011    IMM DTaP/Tdap/Td Vaccine (2 - Td) 10/29/2024 10/29/2014, 2010    COLONOSCOPY 2024            Current Immunizations     Influenza TIV (IM) 2014, 2011    Influenza Vaccine Quad Inj (Preserved) 2016, 10/29/2014  8:47 AM       TD Vaccine 11/1/2010  8:30 PM    Tdap Vaccine 10/29/2014  9:13 AM      Below and/or attached are the medications your provider expects you to take. Review all of your home medications and newly ordered medications with your provider and/or pharmacist. Follow medication instructions as directed by your provider and/or pharmacist. Please keep your medication list with you and share with your provider. Update the information when medications are discontinued, doses are changed, or new medications (including over-the-counter products) are added; and carry medication information at all times in the event of emergency situations     Allergies:  AUGMENTIN - Hives,Rash     ERYTHROMYCIN - Nausea     MORPHINE - Vomiting,Nausea               Medications  Valid as of: February 22, 2017 - 12:35 PM    Generic Name Brand Name Tablet Size Instructions for use    Doxycycline Hyclate (Tab) VIBRAMYCIN 100 MG Take 1 Tab by mouth 2 times a day.        Doxycycline Hyclate (Tab) VIBRAMYCIN 100 MG Take 1 Tab by mouth 2 times a day.        FLUoxetine HCl (Cap) PROZAC 40 MG Take 1 Cap by mouth every day.        Fluticasone Propionate (Suspension) FLONASE 50 MCG/ACT Spray 2 Sprays in nose every day.        Guaifenesin-Codeine (Solution) ROBITUSSIN -10 mg/5mL Take 5 mL by mouth every four hours as needed.        LORazepam (Tab) ATIVAN 1 MG Take 1 Tab by mouth 2 times a day as needed for Anxiety.        Meloxicam (Tab) MOBIC 15 MG Take 1 Tab by mouth every day.        Multiple Vitamins-Minerals   Take  by mouth every day.        Promethazine HCl (Tab) PHENERGAN 25 MG Take 1 Tab by mouth every 6 hours as needed for Nausea/Vomiting.        Zolpidem Tartrate (Tab CR) AMBIEN CR 12.5 MG Take 1 Tab by mouth at bedtime as needed for Sleep.        .                 Medicines prescribed today were sent to:     [id number] [place] [facility] [place] [address] [facility]    [address] [facility] [place] [address]    Phone: [phone] Fax:  [phone]    Open 24 Hours?: No      Medication refill instructions:       If your prescription bottle indicates you have medication refills left, it is not necessary to call your provider’s office. Please contact your pharmacy and they will refill your medication.    If your prescription bottle indicates you do not have any refills left, you may request refills at any time through one of the following ways: The online Satellogic system (except Urgent Care), by calling your provider’s office, or by asking your pharmacy to contact your provider’s office with a refill request. Medication refills are processed only during regular business hours and may not be available until the next business day. Your provider may request additional information or to have a follow-up visit with you prior to refilling your medication.   *Please Note: Medication refills are assigned a new Rx number when refilled electronically. Your pharmacy may indicate that no refills were authorized even though a new prescription for the same medication is available at the pharmacy. Please request the medicine by name with the pharmacy before contacting your provider for a refill.        Referral     A referral request has been sent to our patient care coordination department. Please allow 3-5 business days for us to process this request and contact you either by phone or mail. If you do not hear from us by the 5th business day, please call us at [phone].           Satellogic Access Code: [email or web address]  Expires: 3/11/2017  2:16 PM    Satellogic  A secure, online tool to manage your health information     [employer]’s Satellogic® is a secure, online tool that connects you to your personalized health information from the privacy of your home -- day or night - making it very easy for you to manage your healthcare. Once the activation process is completed, you can even access your medical information using the Satellogic kali, which is available for  free in the Apple Joshua store or Google Play store.     [employer] provides the following levels of access (as shown below):   My Chart Features   [facility] Primary [facility] Doctor [facility]  Specialists [facility]  Urgent  Care Non-[facility]  Primary Care  Doctor   Email your healthcare team securely and privately 24/7 X X X    Manage appointments: schedule your next appointment; view details of past/upcoming appointments X      Request prescription refills. X      View recent personal medical records, including lab and immunizations X X X X   View health record, including health history, allergies, medications X X X X   Read reports about your outpatient visits, procedures, consult and ER notes X X X X   See your discharge summary, which is a recap of your hospital and/or ER visit that includes your diagnosis, lab results, and care plan. X X       How to register for [employer]:  1. Go to  [email or web address].  2. Click on the Sign Up Now box, which takes you to the New Member Sign Up page. You will need to provide the following information:  a. Enter your [employer] Access Code exactly as it appears at the top of this page. (You will not need to use this code after you’ve completed the sign-up process. If you do not sign up before the expiration date, you must request a new code.)   b. Enter your date of birth.   c. Enter your home email address.   d. Click Submit, and follow the next screen’s instructions.  3. Create a [employer] ID. This will be your [employer] login ID and cannot be changed, so think of one that is secure and easy to remember.  4. Create a [employer] password. You can change your password at any time.  5. Enter your Password Reset Question and Answer. This can be used at a later time if you forget your password.   6. Enter your e-mail address. This allows you to receive e-mail notifications when new information is available in [employer].  7. Click Sign Up. You can now view your health information.    For assistance  activating your [employer] account, call [phone]

## 2017-02-24 ENCOUNTER — HOSPITAL ENCOUNTER (EMERGENCY)
Facility: MEDICAL CENTER | Age: 57
End: 2017-02-24
Attending: EMERGENCY MEDICINE
Payer: COMMERCIAL

## 2017-02-24 ENCOUNTER — APPOINTMENT (OUTPATIENT)
Dept: RADIOLOGY | Facility: MEDICAL CENTER | Age: 57
End: 2017-02-24
Attending: EMERGENCY MEDICINE
Payer: COMMERCIAL

## 2017-02-24 VITALS
HEART RATE: 99 BPM | OXYGEN SATURATION: 92 % | BODY MASS INDEX: 33.12 KG/M2 | SYSTOLIC BLOOD PRESSURE: 135 MMHG | DIASTOLIC BLOOD PRESSURE: 82 MMHG | HEIGHT: 63 IN | TEMPERATURE: 98.4 F | WEIGHT: 186.95 LBS | RESPIRATION RATE: 16 BRPM

## 2017-02-24 DIAGNOSIS — S92.414A CLOSED NONDISPLACED FRACTURE OF PROXIMAL PHALANX OF RIGHT GREAT TOE, INITIAL ENCOUNTER: ICD-10-CM

## 2017-02-24 DIAGNOSIS — S00.01XA SCALP ABRASION, INITIAL ENCOUNTER: ICD-10-CM

## 2017-02-24 PROCEDURE — 99284 EMERGENCY DEPT VISIT MOD MDM: CPT

## 2017-02-24 PROCEDURE — 700111 HCHG RX REV CODE 636 W/ 250 OVERRIDE (IP): Performed by: EMERGENCY MEDICINE

## 2017-02-24 PROCEDURE — 73660 X-RAY EXAM OF TOE(S): CPT | Mod: RT

## 2017-02-24 RX ORDER — ONDANSETRON 4 MG/1
4 TABLET, ORALLY DISINTEGRATING ORAL ONCE
Status: COMPLETED | OUTPATIENT
Start: 2017-02-24 | End: 2017-02-24

## 2017-02-24 RX ADMIN — ONDANSETRON 4 MG: 4 TABLET, ORALLY DISINTEGRATING ORAL at 20:28

## 2017-02-24 ASSESSMENT — PAIN SCALES - GENERAL
PAINLEVEL_OUTOF10: 7
PAINLEVEL_OUTOF10: 7

## 2017-02-24 NOTE — ED AVS SNAPSHOT
Simphatic Access Code: [email or web address]  Expires: 3/11/2017  2:16 PM    Simphatic  A secure, online tool to manage your health information     [employer]’s Simphatic® is a secure, online tool that connects you to your personalized health information from the privacy of your home -- day or night - making it very easy for you to manage your healthcare. Once the activation process is completed, you can even access your medical information using the Simphatic joshua, which is available for free in the Apple Joshua store or Google Play store.     Simphatic provides the following levels of access (as shown below):   My Chart Features   [facility] Primary Care Doctor [facility]  Specialists [facility]  Urgent  Care Non-[facility]  Primary Care  Doctor   Email your healthcare team securely and privately 24/7 X X X X   Manage appointments: schedule your next appointment; view details of past/upcoming appointments X      Request prescription refills. X      View recent personal medical records, including lab and immunizations X X X X   View health record, including health history, allergies, medications X X X X   Read reports about your outpatient visits, procedures, consult and ER notes X X X X   See your discharge summary, which is a recap of your hospital and/or ER visit that includes your diagnosis, lab results, and care plan. X X       How to register for Simphatic:  1. Go to  [email or web address].  2. Click on the Sign Up Now box, which takes you to the New Member Sign Up page. You will need to provide the following information:  a. Enter your Simphatic Access Code exactly as it appears at the top of this page. (You will not need to use this code after you’ve completed the sign-up process. If you do not sign up before the expiration date, you must request a new code.)   b. Enter your date of birth.   c. Enter your home email address.   d. Click Submit, and follow the next screen’s instructions.  3. Create a Simphatic ID. This will be your Simphatic  login ID and cannot be changed, so think of one that is secure and easy to remember.  4. Create a Qiro password. You can change your password at any time.  5. Enter your Password Reset Question and Answer. This can be used at a later time if you forget your password.   6. Enter your e-mail address. This allows you to receive e-mail notifications when new information is available in [employer]iro.  7. Click Sign Up. You can now view your health information.    For assistance activating your [employer] account, call [phone]

## 2017-02-24 NOTE — ED AVS SNAPSHOT
2/24/2017          Dinora Umana  [address] [place] [address]    Dear Dinora:    [facility] wants to ensure your discharge home is safe and you or your loved ones have had all your questions answered regarding your care after you leave the hospital.    You may receive a telephone call within two days of your discharge.  This call is to make certain you understand your discharge instructions as well as ensure we provided you with the best care possible during your stay with us.     The call will only last approximately 3-5 minutes and will be done by a nurse.    Once again, we want to ensure your discharge home is safe and that you have a clear understanding of any next steps in your care.  If you have any questions or concerns, please do not hesitate to contact us, we are here for you.  Thank you for choosing [facility] for your healthcare needs.    Sincerely,    Joel Galicia    [facility]

## 2017-02-24 NOTE — ED AVS SNAPSHOT
Home Care Instructions                                                                                                                Dinora Umana   MRN: [id number]    Department:  [facility], Emergency Dept   Date of Visit:  2/24/2017            [facility], Emergency Dept    [address]    [place] [address]    Phone:  [phone]      You were seen by     Clarence Keith M.D.      Your Diagnosis Was     Scalp abrasion, initial encounter     S00.01XA       These are the medications you received during your hospitalization from 02/24/2017 1752 to 02/24/2017 2117     Date/Time Order Dose Route Action    02/24/2017 2028 ondansetron (ZOFRAN ODT) dispertab 4 mg 4 mg Oral Given      Follow-up Information     1. Schedule an appointment as soon as possible for a visit with Reagan Liu M.D..    Specialty:  Orthopaedics    Why:  Call Summer at [phone] to schedule a follow up appointment.     Contact information    [address]  [address]  [place] [address]  [phone]        Medication Information     Review all of your home medications and newly ordered medications with your primary doctor and/or pharmacist as soon as possible. Follow medication instructions as directed by your doctor and/or pharmacist.     Please keep your complete medication list with you and share with your physician. Update the information when medications are discontinued, doses are changed, or new medications (including over-the-counter products) are added; and carry medication information at all times in the event of emergency situations.               Medication List      ASK your doctor about these medications        Instructions    * doxycycline 100 MG Tabs   Commonly known as:  VIBRAMYCIN    Take 1 Tab by mouth 2 times a day.   Dose:  100 mg       * doxycycline 100 MG Tabs   Commonly known as:  VIBRAMYCIN    Doctor's comments:  Start if your sputum becomes  productive/. Purulent.   Take 1 Tab by mouth 2 times a day.   Dose:  100 mg       fluoxetine 40 MG capsule   Commonly known as:  PROZAC    Take 1 Cap by mouth every day.   Dose:  40 mg       fluticasone 50 MCG/ACT nasal spray   Commonly known as:  FLONASE    Spray 2 Sprays in nose every day.   Dose:  2 Spray       guaifenesin-codeine Soln oral solution   Commonly known as:  CHERATUSSIN AC    Take 5 mL by mouth every four hours as needed.   Dose:  5 mL       lorazepam 1 MG Tabs   Commonly known as:  ATIVAN    Take 1 Tab by mouth 2 times a day as needed for Anxiety.   Dose:  1 mg       meloxicam 15 MG tablet   Commonly known as:  MOBIC    Take 1 Tab by mouth every day.   Dose:  15 mg       MULTIVITAMIN PO    Take  by mouth every day.       promethazine 25 MG Tabs   Commonly known as:  PHENERGAN    Take 1 Tab by mouth every 6 hours as needed for Nausea/Vomiting.   Dose:  25 mg       zolpidem 12.5 MG CR tablet   Commonly known as:  AMBIEN CR    Doctor's comments:  Hold on file   Take 1 Tab by mouth at bedtime as needed for Sleep.   Dose:  12.5 mg       * Notice:  This list has 2 medication(s) that are the same as other medications prescribed for you. Read the directions carefully, and ask your doctor or other care provider to review them with you.            Procedures and tests performed during your visit     DX-TOE(S) 2+ RIGHT    SPLINT APPLICATION        Discharge Instructions       Foot Fracture  Your caregiver has diagnosed you as having a foot fracture (broken bone). Your foot has many bones. You have a fracture, or break, in one of these bones. In some cases, your doctor may put on a splint or removable fracture boot until the swelling in your foot has lessened. A cast may or may not be required.  HOME CARE INSTRUCTIONS   If you do not have a cast or splint:  · You may bear weight on your injured foot as tolerated or advised.   · Do not put any weight on your injured foot for as long as directed by your  caregiver. Slowly increase the amount of time you walk on the foot as the pain and swelling allows or as advised.   · Use crutches until you can bear weight without pain. A gradual increase in weight bearing may help.   · Apply ice to the injury for 15-20 minutes each hour while awake for the first 2 days. Put the ice in a plastic bag and place a towel between the bag of ice and your skin.   · If an ace bandage (stretchy, elastic wrapping bandage) was applied, you may re-wrap it if ankle is more painful or your toes become cold and swollen.   If you have a cast or splint:  · Use your crutches for as long as directed by your caregiver.   · To lessen the swelling, keep the injured foot elevated on pillows while lying down or sitting. Elevate your foot above your heart.   · Apply ice to the injury for 15-20 minutes each hour while awake for the first 2 days. Put the ice in a plastic bag and place a thin towel between the bag of ice and your cast.   · Plaster or fiberglass cast:   · Do not try to scratch the skin under the cast using a sharp or pointed object down the cast.   · Check the skin around the cast every day. You may put lotion on any red or sore areas.   · Keep your cast clean and dry.   · Plaster splint:   · Wear the splint until you are seen for a follow-up examination.   · You may loosen the elastic around the splint if your toes become numb, tingle, or turn blue or cold. Do not rest it on anything harder than a pillow in the first 24 hours.   · Do not put pressure on any part of your splint. Use your crutches as directed.   · Keep your splint dry. It can be protected during bathing with a plastic bag. Do not lower the splint into water.   · If you have a fracture boot you may remove it to shower. Bear weight only as instructed by your caregiver.   · Only take over-the-counter or prescription medicines for pain, discomfort, or fever as directed by your caregiver.   SEEK IMMEDIATE MEDICAL CARE IF:   · Your  cast gets damaged or breaks.   · You have continued severe pain or more swelling than you did before the cast was put on.   · Your skin or nails of your casted foot turn blue, gray, feel cold or numb.   · There is a bad smell from your cast.   · There is severe pain with movement of your toes.   · There are new stains and/or drainage coming from under the cast.   MAKE SURE YOU:   · Understand these instructions.   · Will watch your condition.   · Will get help right away if you are not doing well or get worse.   Document Released: 12/15/2001 Document Revised: 03/11/2013 Document Reviewed: 01/21/2010  ExitCare® Patient Information ©2013 [employer].          Patient Information     Patient Information    Following emergency treatment: all patient requiring follow-up care must return either to a private physician or a clinic if your condition worsens before you are able to obtain further medical attention, please return to the emergency room.     Billing Information    At [facility], we work to make the billing process streamlined for our patients.  Our Representatives are here to answer any questions you may have regarding your hospital bill.  If you have insurance coverage and have supplied your insurance information to us, we will submit a claim to your insurer on your behalf.  Should you have any questions regarding your bill, we can be reached online or by phone as follows:  Online: You are able pay your bills online or live chat with our representatives about any billing questions you may have. We are here to help Monday - Friday from 8:00am to 7:30pm and 9:00am - 12:00pm on Saturdays.  Please visit [email or web address]  for more information.   Phone:  [phone] or [phone]    Please note that your emergency physician, surgeon, pathologist, radiologist, anesthesiologist, and other specialists are not employed by [facility] and will therefore bill separately for their  services.  Please contact them directly for any questions concerning their bills at the numbers below:     Emergency Physician Services:  [phone]  [facility]:  [phone]  [facility]:  [phone]  [facility]:  [phone]    1. Your final bill may vary from the amount quoted upon discharge if all procedures are not complete at that time, or if your doctor has additional procedures of which we are not aware. You will receive an additional bill if you return to the Emergency Department at [facility] for suture removal regardless of the facility of which the sutures were placed.     2. Please arrange for settlement of this account at the emergency registration.    3. All self-pay accounts are due in full at the time of treatment.  If you are unable to meet this obligation then payment is expected within 4-5 days.     4. If you have had radiology studies (CT, X-ray, Ultrasound, MRI), you have received a preliminary result during your emergency department visit. Please contact the radiology department [phone] to receive a copy of your final result. Please discuss the Final result with your primary physician or with the follow up physician provided.     Crisis Hotline:  [place] Crisis Hotline:  1-277-EXRKFJZ or 1-706.360.8394  [place] Crisis Hotline:    1-938.480.8901 or 940-249-6951         ED Discharge Follow Up Questions    1. In order to provide you with very good care, we would like to follow up with a phone call in the next few days.  May we have your permission to contact you?     YES /  NO    2. What is the best phone number to call you? (       )_____-__________    3. What is the best time to call you?      Morning  /  Afternoon  /  Evening                   Patient Signature:  ____________________________________________________________    Date:  ____________________________________________________________

## 2017-02-25 NOTE — ED NOTES
"Chief Complaint   Patient presents with   • Fall     fell over while reaching for a glass of water   • Head Injury     hit head on marble table, no LOC     /82 mmHg  Pulse 97  Temp(Src) 36.9 °C (98.4 °F)  Resp 16  Ht 1.6 m (5' 3\")  Wt 84.8 kg (186 lb 15.2 oz)  BMI 33.13 kg/m2  SpO2 96%    "

## 2017-02-25 NOTE — ED PROVIDER NOTES
ED Provider Note    CHIEF COMPLAINT  Chief Complaint   Patient presents with   • Fall     fell over while reaching for a glass of water   • Head Injury     hit head on marble table, no LOC       HPI  Dinora Umana is a 56 y.o. female who presents for evaluation of scalp and right big toe pain. The patient states that she took a nap this afternoon after waking at approximately 445 this afternoon, the patient got out of bed, tripped on her right big toe, causing the toe to fold underneath her right foot. The patient states that she impacted the top of her scalp against a marble countertop, and sustained pain to the top of her head. The patient denies associated loss of consciousness, but states that she has had some edema underneath the area of impact at the top of her scalp over this period of time, and secondary to this, was concerned that she may have a serious head injury. Secondary to this and ongoing right toe pain which has not impacted her ability to ambulate, the patient decided to present here for further evaluation.    REVIEW OF SYSTEMS  See HPI for further details. All other systems are negative.     PAST MEDICAL HISTORY   has a past medical history of Insomnia; Allergy; Depression; Ex-smoker; Alcohol abuse; Left wrist fracture (2013); and Thyrotoxicosis.    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    CURRENT MEDICATIONS  Home Medications     **Home medications have not yet been reviewed for this encounter**     "      ALLERGIES  Allergies   Allergen Reactions   • Augmentin Hives and Rash   • Erythromycin Nausea   • Morphine Vomiting and Nausea       PHYSICAL EXAM  VITAL SIGNS: /82 mmHg  Pulse 97  Temp(Src) 36.9 °C (98.4 °F)  Resp 16  Ht 1.6 m (5' 3\")  Wt 84.8 kg (186 lb 15.2 oz)  BMI 33.13 kg/m2  SpO2 96%   Pulse ox interpretation: I interpret this pulse ox as normal.  Constitutional: Alert in no apparent distress.  HENT: Normocephalic, 6 cm x 4 cm area over the top of the scalp with abrasion overlying, and a slight hematoma underneath. There is no palpable skull deficits noted, no lacerations, Bilateral external ears normal. Nose normal.   Eyes: Pupils are equal and reactive. Conjunctiva normal, non-icteric.   Heart: Regular rate and rythm.  Lungs: No audible wheezing, no increased work of breathing, no accessory muscle use.  Abdomen: Soft, non-distended, non-tender   Skin: Warm, Dry, No erythema, No rash.   Extremities: Right big toe with some tenderness to palpation throughout the digit, full range of motion however to both passive and active range, is fully intact. Cap refills less than 2 seconds, and sensation is intact.  Neurologic: Alert, Grossly non-focal.   Psychiatric: Affect normal, Judgment normal, Mood normal, appears alert and not intoxicated.     DX-TOE(S) 2+ RIGHT   Final Result      1.  Minimally displaced comminuted intra-articular fracture of the RIGHT 1st distal phalanx.   2.  No dislocation.          COURSE & MEDICAL DECISION MAKING  Pertinent Labs & Imaging studies reviewed. (See chart for details)    Patient presenting here for evaluation of scalp pain and right big toe pain. Here on initial examination the patient has an abrasion with underlying hematoma at the top of the scalp. The patient is not on any blood thinners, and is not old enough to have significant risk factors for shearing forces causing subdural hemorrhage. Additionally, the patient has no headache, or other associated " symptoms suggestive of serious intracranial hemorrhage. The patient has some mild tenderness to palpation of the right big toe and x-ray shows evidence of a minimally displaced comminuted intra-articular fracture of the right 1st distal phalanx. Given this, she likely has a abrasion over the top of her scalp, and 1st toe fracture. I spoke with Dr. Deleon, on-call orthopedics, who stated that the patient would do well in a postsurgical shoe, and that he would be happy to follow her up in his clinic in 1 week.    The patient will not drink alcohol nor drive with prescribed medications. The patient will return for worsening symptoms and is stable at the time of discharge. The patient verbalizes understanding and will comply.    The patient is referred to a primary physician for blood pressure management, diabetic screening, and for all other preventative health concerns, should they be present.    FINAL IMPRESSION  1. Scalp abrasion  2. Right 1st toe.  3.         Electronically signed by: Clarence Keith, 2/24/2017 7:49 PM      This record was made with a voice recognition software. I have tried to correct any grammar, spelling or context errors to the best of my ability, but errors may still remain. Interpretation of this chart should be taken in this context.

## 2017-02-25 NOTE — DISCHARGE INSTRUCTIONS
Foot Fracture  Your caregiver has diagnosed you as having a foot fracture (broken bone). Your foot has many bones. You have a fracture, or break, in one of these bones. In some cases, your doctor may put on a splint or removable fracture boot until the swelling in your foot has lessened. A cast may or may not be required.  HOME CARE INSTRUCTIONS   If you do not have a cast or splint:  · You may bear weight on your injured foot as tolerated or advised.   · Do not put any weight on your injured foot for as long as directed by your caregiver. Slowly increase the amount of time you walk on the foot as the pain and swelling allows or as advised.   · Use crutches until you can bear weight without pain. A gradual increase in weight bearing may help.   · Apply ice to the injury for 15-20 minutes each hour while awake for the first 2 days. Put the ice in a plastic bag and place a towel between the bag of ice and your skin.   · If an ace bandage (stretchy, elastic wrapping bandage) was applied, you may re-wrap it if ankle is more painful or your toes become cold and swollen.   If you have a cast or splint:  · Use your crutches for as long as directed by your caregiver.   · To lessen the swelling, keep the injured foot elevated on pillows while lying down or sitting. Elevate your foot above your heart.   · Apply ice to the injury for 15-20 minutes each hour while awake for the first 2 days. Put the ice in a plastic bag and place a thin towel between the bag of ice and your cast.   · Plaster or fiberglass cast:   · Do not try to scratch the skin under the cast using a sharp or pointed object down the cast.   · Check the skin around the cast every day. You may put lotion on any red or sore areas.   · Keep your cast clean and dry.   · Plaster splint:   · Wear the splint until you are seen for a follow-up examination.   · You may loosen the elastic around the splint if your toes become numb, tingle, or turn blue or cold. Do not  rest it on anything harder than a pillow in the first 24 hours.   · Do not put pressure on any part of your splint. Use your crutches as directed.   · Keep your splint dry. It can be protected during bathing with a plastic bag. Do not lower the splint into water.   · If you have a fracture boot you may remove it to shower. Bear weight only as instructed by your caregiver.   · Only take over-the-counter or prescription medicines for pain, discomfort, or fever as directed by your caregiver.   SEEK IMMEDIATE MEDICAL CARE IF:   · Your cast gets damaged or breaks.   · You have continued severe pain or more swelling than you did before the cast was put on.   · Your skin or nails of your casted foot turn blue, gray, feel cold or numb.   · There is a bad smell from your cast.   · There is severe pain with movement of your toes.   · There are new stains and/or drainage coming from under the cast.   MAKE SURE YOU:   · Understand these instructions.   · Will watch your condition.   · Will get help right away if you are not doing well or get worse.   Document Released: 12/15/2001 Document Revised: 03/11/2013 Document Reviewed: 01/21/2010  ExitCare® Patient Information ©2013 [employer].

## 2017-03-12 ENCOUNTER — OFFICE VISIT (OUTPATIENT)
Dept: URGENT CARE | Facility: CLINIC | Age: 57
End: 2017-03-12
Payer: COMMERCIAL

## 2017-03-12 VITALS
HEART RATE: 102 BPM | BODY MASS INDEX: 31.99 KG/M2 | DIASTOLIC BLOOD PRESSURE: 74 MMHG | WEIGHT: 187.4 LBS | SYSTOLIC BLOOD PRESSURE: 110 MMHG | TEMPERATURE: 97.9 F | RESPIRATION RATE: 18 BRPM | HEIGHT: 64 IN | OXYGEN SATURATION: 97 %

## 2017-03-12 DIAGNOSIS — J34.89 NASAL VESTIBULITIS: ICD-10-CM

## 2017-03-12 PROCEDURE — 99214 OFFICE O/P EST MOD 30 MIN: CPT | Performed by: FAMILY MEDICINE

## 2017-03-12 RX ORDER — SULFAMETHOXAZOLE AND TRIMETHOPRIM 800; 160 MG/1; MG/1
TABLET ORAL
Qty: 20 TAB | Refills: 0 | Status: SHIPPED | OUTPATIENT
Start: 2017-03-12 | End: 2018-02-23

## 2017-03-12 NOTE — MR AVS SNAPSHOT
"Dinora Umana   3/12/2017 11:15 AM   Office Visit   MRN: [id number]    Department:  [facility]   Dept Phone:  [phone]    Description:  Female : 1960   Provider:  Tino Moreno M.D.           Reason for Visit     Facial Swelling (L) nostril, tender to touch      Allergies as of 3/12/2017     Allergen Noted Reactions    Augmentin 2008   Hives, Rash    Erythromycin 2017   Nausea    Morphine 2015   Vomiting, Nausea      You were diagnosed with     Nasal vestibulitis   [370036]         Vital Signs     Blood Pressure Pulse Temperature Respirations Height Weight    110/74 mmHg 102 36.6 °C (97.9 °F) 18 1.613 m (5' 3.5\") 85.004 kg (187 lb 6.4 oz)    Body Mass Index Oxygen Saturation Smoking Status             32.67 kg/m2 97% Former Smoker         Basic Information     Date Of Birth Sex Race Ethnicity Preferred Language    1960 Female White Non- English      Problem List              ICD-10-CM Priority Class Noted - Resolved    MDD (major depressive disorder), recurrent episode, mild (CMS-HCC) F33.0   Unknown - Present    Insomnia G47.00   Unknown - Present    Numbness and tingling in left hand R20.2   2014 - Present    Anxiety F41.9   2014 - Present    Recovering alcoholic in remission (CMS-HCC) F10.21   2014 - Present    Seasonal allergies J30.2   2014 - Present    Hyperlipidemia E78.5   10/29/2014 - Present    Diverticulosis of colon K57.30   2014 - Present    Bilateral hearing loss H91.93   2015 - Present    Obesity (BMI 30-39.9) E66.9   2016 - Present      Health Maintenance        Date Due Completion Dates    MAMMOGRAM 2015, 2013, 2011, 2010, 2010, 2006    PAP SMEAR 2016, 2011    IMM DTaP/Tdap/Td Vaccine (2 - Td) 10/29/2024 10/29/2014, 2010    COLONOSCOPY 2024 2014            Current Immunizations     Influenza TIV (IM) 2014, 2011   " Influenza Vaccine Quad Inj (Preserved) 11/11/2016, 10/29/2014  8:47 AM    TD Vaccine 11/1/2010  8:30 PM    Tdap Vaccine 10/29/2014  9:13 AM      Below and/or attached are the medications your provider expects you to take. Review all of your home medications and newly ordered medications with your provider and/or pharmacist. Follow medication instructions as directed by your provider and/or pharmacist. Please keep your medication list with you and share with your provider. Update the information when medications are discontinued, doses are changed, or new medications (including over-the-counter products) are added; and carry medication information at all times in the event of emergency situations     Allergies:  AUGMENTIN - Hives,Rash     ERYTHROMYCIN - Nausea     MORPHINE - Vomiting,Nausea               Medications  Valid as of: March 12, 2017 - 12:27 PM    Generic Name Brand Name Tablet Size Instructions for use    FLUoxetine HCl (Cap) PROZAC 40 MG Take 1 Cap by mouth every day.        Fluticasone Propionate (Suspension) FLONASE 50 MCG/ACT Spray 2 Sprays in nose every day.        LORazepam (Tab) ATIVAN 1 MG Take 1 Tab by mouth 2 times a day as needed for Anxiety.        Meloxicam (Tab) MOBIC 15 MG Take 1 Tab by mouth every day.        Multiple Vitamins-Minerals   Take  by mouth every day.        Mupirocin (Ointment) BACTROBAN 2 % APPLY TO SKIN INFECTION 3 TIMES A DAY UNTIL HEALED.        Promethazine HCl (Tab) PHENERGAN 25 MG Take 1 Tab by mouth every 6 hours as needed for Nausea/Vomiting.        Sulfamethoxazole-Trimethoprim (Tab) BACTRIM -160 MG 1 TAB TWICE A DAY X 10 DAYS.        Zolpidem Tartrate (Tab CR) AMBIEN CR 12.5 MG Take 1 Tab by mouth at bedtime as needed for Sleep.        .                 Medicines prescribed today were sent to:     [id number] [place] [facility] [place] - [address]    [address] [place] [address]    Phone: [phone] Fax: [phone]    Open 24 Hours?: No         Medication refill instructions:       If your prescription bottle indicates you have medication refills left, it is not necessary to call your provider’s office. Please contact your pharmacy and they will refill your medication.    If your prescription bottle indicates you do not have any refills left, you may request refills at any time through one of the following ways: The online Synchronized system (except Urgent Care), by calling your provider’s office, or by asking your pharmacy to contact your provider’s office with a refill request. Medication refills are processed only during regular business hours and may not be available until the next business day. Your provider may request additional information or to have a follow-up visit with you prior to refilling your medication.   *Please Note: Medication refills are assigned a new Rx number when refilled electronically. Your pharmacy may indicate that no refills were authorized even though a new prescription for the same medication is available at the pharmacy. Please request the medicine by name with the pharmacy before contacting your provider for a refill.           Synchronized Access Code: [email or web address]  Expires: 4/9/2017  2:39 PM    Synchronized  A secure, online tool to manage your health information     [employer]’s Synchronized® is a secure, online tool that connects you to your personalized health information from the privacy of your home -- day or night - making it very easy for you to manage your healthcare. Once the activation process is completed, you can even access your medical information using the Synchronized joshua, which is available for free in the Apple Joshua store or Google Play store.     Synchronized provides the following levels of access (as shown below):   My Chart Features   [facility] Doctor [facility]  Specialists [facility]  Urgent  Care Non-[facility]  Primary Care  Doctor   Email your healthcare team securely and privately 24/7 X X X    Manage appointments:  schedule your next appointment; view details of past/upcoming appointments X      Request prescription refills. X      View recent personal medical records, including lab and immunizations X X X X   View health record, including health history, allergies, medications X X X X   Read reports about your outpatient visits, procedures, consult and ER notes X X X X   See your discharge summary, which is a recap of your hospital and/or ER visit that includes your diagnosis, lab results, and care plan. X X       How to register for [facility]:  1. Go to  [email or web address].  2. Click on the Sign Up Now box, which takes you to the New Member Sign Up page. You will need to provide the following information:  a. Enter your [facility] Access Code exactly as it appears at the top of this page. (You will not need to use this code after you’ve completed the sign-up process. If you do not sign up before the expiration date, you must request a new code.)   b. Enter your date of birth.   c. Enter your home email address.   d. Click Submit, and follow the next screen’s instructions.  3. Create a Progressive Care ID. This will be your Progressive Care login ID and cannot be changed, so think of one that is secure and easy to remember.  4. Create a [facility] password. You can change your password at any time.  5. Enter your Password Reset Question and Answer. This can be used at a later time if you forget your password.   6. Enter your e-mail address. This allows you to receive e-mail notifications when new information is available in [facility].  7. Click Sign Up. You can now view your health information.    For assistance activating your [facility] account, call [phone]

## 2017-03-12 NOTE — PROGRESS NOTES
Chief Complaint:    Chief Complaint   Patient presents with   • Facial Swelling     (L) nostril, tender to touch       History of Present Illness:    This is a new problem. Symptoms x 3 days; has red, swollen, painful area just at opening of left nare, septal region, getting worse. Using hot compresses to area and tried to nelly with needle last night without help. Bactrim has worked/tolerated in past.      Review of Systems:    Constitutional: Negative for fever, chills, and diaphoresis.   Eyes: Negative for change in vision, photophobia, pain, redness, and discharge.  ENT: See HPI.    Respiratory: Negative for cough, hemoptysis, sputum production, shortness of breath, wheezing, and stridor.    Cardiovascular: Negative for chest pain, palpitations, orthopnea, claudication, leg swelling, and PND.   Gastrointestinal: Negative for abdominal pain, nausea, vomiting, diarrhea, constipation, blood in stool, and melena.   Genitourinary: Negative for dysuria, urinary urgency, urinary frequency, hematuria, and flank pain.   Musculoskeletal: Negative for myalgias, joint pain, neck pain, and back pain.   Skin: Negative for rash and itching.   Neurological: Negative for dizziness, tingling, tremors, sensory change, speech change, focal weakness, seizures, loss of consciousness, and headaches.   Endo: Negative for polydipsia.   Heme: Does not bruise/bleed easily.   Psychiatric/Behavioral: No new symptoms.    Past Medical History:    Past Medical History   Diagnosis Date   • Insomnia    • Allergy    • Depression    • Ex-smoker    • Alcohol abuse    • Left wrist fracture 2013   • Thyrotoxicosis      Graves' disease / TR ab +, resolved       Past Surgical History:    Past Surgical History   Procedure Laterality Date   • Other       eye surgery   • Primary c section  1999, 2000   • Wrist orif  8/1/2013     Left Performed by Madina Mccartney M.D. at [facility]   • Mammoplasty reduction Bilateral 9/29/2015      "Procedure: MAMMOPLASTY REDUCTION;  Surgeon: Sharon Srinivasan M.D.;  Location: SURGERY [facility];  Service:        Social History:    Social History     Social History   • Marital Status:      Spouse Name: N/A   • Number of Children: N/A   • Years of Education: N/A     Occupational History   • Not on file.     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 30 Tab 0   • promethazine (PHENERGAN) 25 MG Tab Take 1 Tab by mouth every 6 hours as needed for Nausea/Vomiting. 30 Tab 0   • fluticasone (FLONASE) 50 MCG/ACT nasal spray Spray 2 Sprays in nose every day. 16 g 3   • zolpidem (AMBIEN CR) 12.5 MG CR tablet Take 1 Tab by mouth at bedtime as needed for Sleep. 30 Tab 2   • lorazepam (ATIVAN) 1 MG Tab Take 1 Tab by mouth 2 times a day as needed for Anxiety. 45 Tab 5   • fluoxetine (PROZAC) 40 MG capsule Take 1 Cap by mouth every day. 90 Cap 3   • Multiple Vitamins-Minerals (MULTIVITAMIN PO) Take  by mouth every day.       No current facility-administered medications on file prior to visit.       Allergies:    Allergies   Allergen Reactions   • Augmentin Hives and Rash   • Erythromycin Nausea   • Morphine Vomiting and Nausea         Vitals:    Filed Vitals:    03/12/17 1123   BP: 110/74   Pulse: 102   Temp: 36.6 °C (97.9 °F)   Resp: 18   Height: 1.613 m (5' 3.5\")   Weight: 85.004 kg (187 lb 6.4 " oz)   SpO2: 97%       Physical Exam:    Constitutional: Vital signs reviewed. Appears well-developed and well-nourished. No acute distress.   Eyes: Sclera white, conjunctivae clear.    ENT: Left nare: just at opening, septal region, the skin is erythematous, swollen, and tender to palpation. Mild pustular appearance centrally with some scabbing (she tried to nelly with needle last night without help). External ears normal. Hearing normal.   Neck: Neck supple.   Pulmonary/Chest: Respirations non-labored.   Musculoskeletal: Normal gait. Normal range of motion. No muscular atrophy or weakness.  Neurological: Alert and oriented to person, place, and time. Muscle tone normal. Coordination normal.   Skin: No rashes or lesions. Warm, dry, normal turgor.  Psychiatric: Normal mood and affect. Behavior is normal. Judgment and thought content normal.       Assessment / Plan:    1. Nasal vestibulitis  - sulfamethoxazole-trimethoprim (BACTRIM DS) 800-160 MG tablet; 1 TAB TWICE A DAY X 10 DAYS.  Dispense: 20 Tab; Refill: 0  - mupirocin (BACTROBAN) 2 % Ointment; APPLY TO SKIN INFECTION 3 TIMES A DAY UNTIL HEALED.  Dispense: 22 g; Refill: 3      Discussed with her DDX and management options.    Agreeable to medications prescribed.    Follow-up with PCP or urgent care if getting worse or not better with above.

## 2017-05-03 ENCOUNTER — OFFICE VISIT (OUTPATIENT)
Dept: MEDICAL GROUP | Facility: MEDICAL CENTER | Age: 57
End: 2017-05-03
Payer: COMMERCIAL

## 2017-05-03 ENCOUNTER — APPOINTMENT (OUTPATIENT)
Dept: MEDICAL GROUP | Facility: MEDICAL CENTER | Age: 57
End: 2017-05-03
Payer: COMMERCIAL

## 2017-05-03 VITALS
HEART RATE: 71 BPM | SYSTOLIC BLOOD PRESSURE: 112 MMHG | HEIGHT: 64 IN | OXYGEN SATURATION: 97 % | WEIGHT: 186 LBS | BODY MASS INDEX: 31.76 KG/M2 | DIASTOLIC BLOOD PRESSURE: 80 MMHG | TEMPERATURE: 98 F

## 2017-05-03 DIAGNOSIS — J30.1 SEASONAL ALLERGIC RHINITIS DUE TO POLLEN: ICD-10-CM

## 2017-05-03 PROCEDURE — 99214 OFFICE O/P EST MOD 30 MIN: CPT | Performed by: FAMILY MEDICINE

## 2017-05-03 RX ORDER — TRIAMCINOLONE ACETONIDE 40 MG/ML
20 INJECTION, SUSPENSION INTRA-ARTICULAR; INTRAMUSCULAR ONCE
Status: COMPLETED | OUTPATIENT
Start: 2017-05-03 | End: 2017-05-03

## 2017-05-03 RX ADMIN — TRIAMCINOLONE ACETONIDE 20 MG: 40 INJECTION, SUSPENSION INTRA-ARTICULAR; INTRAMUSCULAR at 15:31

## 2017-05-03 NOTE — ASSESSMENT & PLAN NOTE
Dinora complains of nasal congestion, rhinorrhea, eye itching, eye irritation itchy, red, irritated eyes  Has tried over the counter medications, prescription nasal sprays using saline mist, netipot.  No asthma -  No increased symptoms with exertion.  Symptoms seasonal in spring.     ROS  No fever, chills, sweats.  No productive cough  No sinus pain or pressure  No swollen glands  No rash. No eczema.  Pertinent  ROS findings as above. All other systems reviewed and are negative.     The patient usually gets Kenalog 20 mg once a year. Her last shot was 4/12/16.

## 2017-05-03 NOTE — PROGRESS NOTES
Subjective:     Chief Complaint   Patient presents with   • Allergic Rhinitis   • Ear Fullness     right side       Dinora Umana is a 56 y.o. female here today for evaluation and management of:    Seasonal allergies    Dinora complains of nasal congestion, rhinorrhea, eye itching, eye irritation itchy, red, irritated eyes  Has tried over the counter medications, prescription nasal sprays using saline mist, netipot.  No asthma -  No increased symptoms with exertion.  Symptoms seasonal in spring.     ROS  No fever, chills, sweats.  No productive cough  No sinus pain or pressure  No swollen glands  No rash. No eczema.  Pertinent  ROS findings as above. All other systems reviewed and are negative.     The patient usually gets Kenalog 20 mg once a year. Her last shot was 4/12/16.          Allergies   Allergen Reactions   • Augmentin Hives and Rash   • Erythromycin Nausea   • Morphine Vomiting and Nausea       Current medicines (including changes today)  Current Outpatient Prescriptions   Medication Sig Dispense Refill   • fluticasone (FLONASE) 50 MCG/ACT nasal spray Spray 2 Sprays in nose every day. 16 g 3   • zolpidem (AMBIEN CR) 12.5 MG CR tablet Take 1 Tab by mouth at bedtime as needed for Sleep. 30 Tab 2   • lorazepam (ATIVAN) 1 MG Tab Take 1 Tab by mouth 2 times a day as needed for Anxiety. 45 Tab 5   • fluoxetine (PROZAC) 40 MG capsule Take 1 Cap by mouth every day. 90 Cap 3   • Multiple Vitamins-Minerals (MULTIVITAMIN PO) Take  by mouth every day.     • sulfamethoxazole-trimethoprim (BACTRIM DS) 800-160 MG tablet 1 TAB TWICE A DAY X 10 DAYS. 20 Tab 0   • mupirocin (BACTROBAN) 2 % Ointment APPLY TO SKIN INFECTION 3 TIMES A DAY UNTIL HEALED. 22 g 3   • meloxicam (MOBIC) 15 MG tablet Take 1 Tab by mouth every day. 30 Tab 0   • promethazine (PHENERGAN) 25 MG Tab Take 1 Tab by mouth every 6 hours as needed for Nausea/Vomiting. 30 Tab 0     Current Facility-Administered Medications   Medication Dose Route Frequency  "Provider Last Rate Last Dose   • triamcinolone acetonide (KENALOG-40) injection 20 mg  20 mg Intramuscular Once Magy Crump M.D.           She  has a past medical history of Insomnia; Allergy; Depression; Ex-smoker; Alcohol abuse; Left wrist fracture (2013); and Thyrotoxicosis.    Patient Active Problem List    Diagnosis Date Noted   • Obesity (BMI 30-39.9) 11/28/2016   • Bilateral hearing loss 12/07/2015   • Diverticulosis of colon 12/08/2014   • Hyperlipidemia 10/29/2014   • Seasonal allergies 04/21/2014   • Numbness and tingling in left hand 01/21/2014   • Anxiety 01/21/2014   • Recovering alcoholic in remission (CMS-HCC) 01/21/2014   • MDD (major depressive disorder), recurrent episode, mild (CMS-HCC)    • Insomnia        ROS   No fever or chills.  No nausea or vomiting.  No chest pain or palpitations.  No cough or SOB.  No pain with urination or hematuria.  No black or bloody stools.       Objective:     Blood pressure 112/80, pulse 71, temperature 36.7 °C (98 °F), height 1.613 m (5' 3.5\"), weight 84.369 kg (186 lb), SpO2 97 %, not currently breastfeeding. Body mass index is 32.43 kg/(m^2).   Physical Exam:  Well developed, well nourished.  Alert, oriented in no acute distress.  Eye contact is good, speech goal directed, affect calm  Eyes: conjunctiva injected diffusely with cobblestoning present, sclera non-icteric. No discharge  Ears: Pinna normal. TM pearly gray.   Nose: Nares are patent. Pale, boggy mucosa with clear discharge  Mouth: Oral mucous membranes pink and moist with no lesions. Diffuse erythema of the posterior pharynx  Neck Supple.  No adenopathy or masses in the neck or supraclavicular regions. No thyromegaly  Lungs: clear to auscultation bilaterally with good excursion. No wheezes or rhonchi  CV: regular rate and rhythm. No murmur      Assessment and Plan:   The following treatment plan was discussed    1. Seasonal allergic rhinitis due to pollen   Education: Discussed that no one medicine " likely to provide complete relief. Discussed allergen avoidance and environment modification when possible, showering and shampooing before bed, taking shoes off indoors so not tracking pollen in home.  Risks and benefits of Kenalog injection discussed. Consent signed.  - triamcinolone acetonide (KENALOG-40) injection 20 mg; 0.5 mL by Intramuscular route Once.    Any change or worsening of signs or symptoms, patient encouraged to follow-up or report to the emergency room for further evaluation. Patient understands and agrees.    Followup: Return if symptoms worsen or fail to improve.

## 2017-06-23 RX ORDER — LORAZEPAM 1 MG/1
TABLET ORAL
Qty: 45 TAB | Refills: 5 | Status: SHIPPED
Start: 2017-06-23 | End: 2018-02-10 | Stop reason: SDUPTHER

## 2017-09-15 ENCOUNTER — TELEPHONE (OUTPATIENT)
Dept: MEDICAL GROUP | Facility: MEDICAL CENTER | Age: 57
End: 2017-09-15

## 2017-12-30 ENCOUNTER — OFFICE VISIT (OUTPATIENT)
Dept: URGENT CARE | Facility: CLINIC | Age: 57
End: 2017-12-30
Payer: COMMERCIAL

## 2017-12-30 VITALS
HEIGHT: 64 IN | OXYGEN SATURATION: 99 % | BODY MASS INDEX: 31.76 KG/M2 | HEART RATE: 66 BPM | RESPIRATION RATE: 16 BRPM | WEIGHT: 186 LBS | SYSTOLIC BLOOD PRESSURE: 122 MMHG | TEMPERATURE: 98.7 F | DIASTOLIC BLOOD PRESSURE: 78 MMHG

## 2017-12-30 DIAGNOSIS — R05.9 COUGH: ICD-10-CM

## 2017-12-30 DIAGNOSIS — J01.00 ACUTE MAXILLARY SINUSITIS, RECURRENCE NOT SPECIFIED: ICD-10-CM

## 2017-12-30 PROCEDURE — 99214 OFFICE O/P EST MOD 30 MIN: CPT | Performed by: NURSE PRACTITIONER

## 2017-12-30 RX ORDER — METHYLPREDNISOLONE 4 MG/1
TABLET ORAL
Qty: 1 KIT | Refills: 0 | Status: SHIPPED | OUTPATIENT
Start: 2017-12-30 | End: 2018-02-23

## 2017-12-30 RX ORDER — BENZONATATE 200 MG/1
200 CAPSULE ORAL 3 TIMES DAILY PRN
Qty: 60 CAP | Refills: 0 | Status: SHIPPED | OUTPATIENT
Start: 2017-12-30 | End: 2018-02-23

## 2017-12-30 RX ORDER — AZITHROMYCIN 250 MG/1
TABLET, FILM COATED ORAL
Qty: 6 TAB | Refills: 0 | Status: SHIPPED | OUTPATIENT
Start: 2017-12-30 | End: 2018-02-23

## 2017-12-31 ASSESSMENT — ENCOUNTER SYMPTOMS
MYALGIAS: 1
FEVER: 0
HEADACHES: 1
SINUS PRESSURE: 1
COUGH: 1
SORE THROAT: 1
CHILLS: 0

## 2018-01-01 NOTE — PROGRESS NOTES
"Subjective:      Dinora Umana is a 57 y.o. female who presents with Laryngitis (congestion x 2 weeks )    Past Medical History:   Diagnosis Date   • Alcohol abuse    • Allergy    • Depression    • Ex-smoker    • Insomnia    • Left wrist fracture 2013   • Thyrotoxicosis     Graves' disease / TR ab +, resolved     Social History     Social History   • Marital status:      Spouse name: N/A   • Number of children: N/A   • Years of education: N/A     Occupational History   • Not on file.     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 The current episode started 1 to 4 weeks ago. The problem is unchanged. There has been no fever. Associated symptoms include congestion, coughing, ear pain, headaches, sinus pressure and a sore throat. Pertinent negatives include no chills. Past treatments include nothing. The treatment provided no relief.       Review of Systems   Constitutional: Positive for malaise/fatigue. Negative for chills and fever.   HENT: Positive for congestion, ear pain, sinus pressure and sore throat.    Respiratory: Positive for cough.    Musculoskeletal: Positive for myalgias.   Skin: Negative.    Neurological: Positive for headaches.   All other systems reviewed and are negative.         Objective:     /78   Pulse 66   Temp 37.1 °C (98.7 °F)   Resp 16   Ht 1.626 m (5' 4\")   Wt 84.4 kg (186 lb)   SpO2 99%   Breastfeeding? No   BMI 31.93 kg/m²      Physical Exam   Constitutional: " She is oriented to person, place, and time. She appears well-developed and well-nourished.   HENT:   Head: Normocephalic.   Right Ear: External ear normal.   Left Ear: External ear normal.   Yellow PND  Tender over the maxillary sinuses   Eyes: EOM are normal. Pupils are equal, round, and reactive to light.   Neck: Normal range of motion. Neck supple.   Cardiovascular: Normal rate and regular rhythm.    Pulmonary/Chest: Effort normal and breath sounds normal.   Musculoskeletal: Normal range of motion.   Neurological: She is alert and oriented to person, place, and time.   Skin: Skin is warm and dry. Capillary refill takes less than 2 seconds.   Psychiatric: She has a normal mood and affect. Her behavior is normal. Judgment and thought content normal.   Vitals reviewed.              Assessment/Plan:     1. Acute maxillary sinusitis, recurrence not specified    - azithromycin (ZITHROMAX) 250 MG Tab; Take 2 pills by mouth on day one, take 1 pill by mouth on days 2-5  Dispense: 6 Tab; Refill: 0  - MethylPREDNISolone (MEDROL DOSEPAK) 4 MG Tablet Therapy Pack; Take according to package instructions  Dispense: 1 Kit; Refill: 0  -flonase PRN  -follow up ifs ymptoms persist or owrsen     2. Cough    - benzonatate (TESSALON) 200 MG capsule; Take 1 Cap by mouth 3 times a day as needed.  Dispense: 60 Cap; Refill: 0  -humidifier

## 2018-02-10 DIAGNOSIS — F41.9 ANXIETY: ICD-10-CM

## 2018-02-12 RX ORDER — LORAZEPAM 1 MG/1
TABLET ORAL
Qty: 45 TAB | Refills: 1 | Status: SHIPPED
Start: 2018-02-12 | End: 2018-02-12 | Stop reason: SDUPTHER

## 2018-02-12 RX ORDER — LORAZEPAM 1 MG/1
1 TABLET ORAL 2 TIMES DAILY PRN
Qty: 45 TAB | Refills: 1 | Status: SHIPPED
Start: 2018-02-12 | End: 2018-03-14

## 2018-02-23 ENCOUNTER — OFFICE VISIT (OUTPATIENT)
Dept: MEDICAL GROUP | Facility: MEDICAL CENTER | Age: 58
End: 2018-02-23
Payer: COMMERCIAL

## 2018-02-23 ENCOUNTER — HOSPITAL ENCOUNTER (OUTPATIENT)
Dept: LAB | Facility: MEDICAL CENTER | Age: 58
End: 2018-02-23
Attending: FAMILY MEDICINE
Payer: COMMERCIAL

## 2018-02-23 VITALS
RESPIRATION RATE: 12 BRPM | BODY MASS INDEX: 33.02 KG/M2 | WEIGHT: 193.4 LBS | TEMPERATURE: 97.8 F | OXYGEN SATURATION: 100 % | SYSTOLIC BLOOD PRESSURE: 110 MMHG | DIASTOLIC BLOOD PRESSURE: 62 MMHG | HEART RATE: 77 BPM | HEIGHT: 64 IN

## 2018-02-23 DIAGNOSIS — E78.00 PURE HYPERCHOLESTEROLEMIA: ICD-10-CM

## 2018-02-23 DIAGNOSIS — E66.9 OBESITY (BMI 30-39.9): ICD-10-CM

## 2018-02-23 DIAGNOSIS — F10.21 RECOVERING ALCOHOLIC IN REMISSION (HCC): ICD-10-CM

## 2018-02-23 DIAGNOSIS — F51.01 PRIMARY INSOMNIA: ICD-10-CM

## 2018-02-23 DIAGNOSIS — Z00.00 ANNUAL PHYSICAL EXAM: ICD-10-CM

## 2018-02-23 DIAGNOSIS — Z12.83 SKIN CANCER SCREENING: ICD-10-CM

## 2018-02-23 DIAGNOSIS — F33.0 MDD (MAJOR DEPRESSIVE DISORDER), RECURRENT EPISODE, MILD (HCC): ICD-10-CM

## 2018-02-23 DIAGNOSIS — F41.9 ANXIETY: ICD-10-CM

## 2018-02-23 DIAGNOSIS — Z12.31 ENCOUNTER FOR SCREENING MAMMOGRAM FOR BREAST CANCER: ICD-10-CM

## 2018-02-23 DIAGNOSIS — Z12.4 CERVICAL CANCER SCREENING: ICD-10-CM

## 2018-02-23 LAB
ALBUMIN SERPL BCP-MCNC: 4.3 G/DL (ref 3.2–4.9)
ALBUMIN/GLOB SERPL: 1.5 G/DL
ALP SERPL-CCNC: 71 U/L (ref 30–99)
ALT SERPL-CCNC: 18 U/L (ref 2–50)
ANION GAP SERPL CALC-SCNC: 6 MMOL/L (ref 0–11.9)
AST SERPL-CCNC: 18 U/L (ref 12–45)
BASOPHILS # BLD AUTO: 0.7 % (ref 0–1.8)
BASOPHILS # BLD: 0.05 K/UL (ref 0–0.12)
BILIRUB SERPL-MCNC: 0.3 MG/DL (ref 0.1–1.5)
BUN SERPL-MCNC: 16 MG/DL (ref 8–22)
CALCIUM SERPL-MCNC: 9.3 MG/DL (ref 8.5–10.5)
CHLORIDE SERPL-SCNC: 102 MMOL/L (ref 96–112)
CHOLEST SERPL-MCNC: 206 MG/DL (ref 100–199)
CO2 SERPL-SCNC: 29 MMOL/L (ref 20–33)
CREAT SERPL-MCNC: 0.69 MG/DL (ref 0.5–1.4)
EOSINOPHIL # BLD AUTO: 0.13 K/UL (ref 0–0.51)
EOSINOPHIL NFR BLD: 1.9 % (ref 0–6.9)
ERYTHROCYTE [DISTWIDTH] IN BLOOD BY AUTOMATED COUNT: 46 FL (ref 35.9–50)
GLOBULIN SER CALC-MCNC: 2.9 G/DL (ref 1.9–3.5)
GLUCOSE SERPL-MCNC: 85 MG/DL (ref 65–99)
HCT VFR BLD AUTO: 36.2 % (ref 37–47)
HDLC SERPL-MCNC: 59 MG/DL
HGB BLD-MCNC: 11.7 G/DL (ref 12–16)
IMM GRANULOCYTES # BLD AUTO: 0.02 K/UL (ref 0–0.11)
IMM GRANULOCYTES NFR BLD AUTO: 0.3 % (ref 0–0.9)
LDLC SERPL CALC-MCNC: 130 MG/DL
LYMPHOCYTES # BLD AUTO: 2.84 K/UL (ref 1–4.8)
LYMPHOCYTES NFR BLD: 41.5 % (ref 22–41)
MCH RBC QN AUTO: 29.3 PG (ref 27–33)
MCHC RBC AUTO-ENTMCNC: 32.3 G/DL (ref 33.6–35)
MCV RBC AUTO: 90.7 FL (ref 81.4–97.8)
MONOCYTES # BLD AUTO: 0.56 K/UL (ref 0–0.85)
MONOCYTES NFR BLD AUTO: 8.2 % (ref 0–13.4)
NEUTROPHILS # BLD AUTO: 3.24 K/UL (ref 2–7.15)
NEUTROPHILS NFR BLD: 47.4 % (ref 44–72)
NRBC # BLD AUTO: 0 K/UL
NRBC BLD-RTO: 0 /100 WBC
PLATELET # BLD AUTO: 305 K/UL (ref 164–446)
PMV BLD AUTO: 9.9 FL (ref 9–12.9)
POTASSIUM SERPL-SCNC: 4.6 MMOL/L (ref 3.6–5.5)
PROT SERPL-MCNC: 7.2 G/DL (ref 6–8.2)
RBC # BLD AUTO: 3.99 M/UL (ref 4.2–5.4)
SODIUM SERPL-SCNC: 137 MMOL/L (ref 135–145)
TRIGL SERPL-MCNC: 84 MG/DL (ref 0–149)
TSH SERPL DL<=0.005 MIU/L-ACNC: 0.67 UIU/ML (ref 0.38–5.33)
WBC # BLD AUTO: 6.8 K/UL (ref 4.8–10.8)

## 2018-02-23 PROCEDURE — 80061 LIPID PANEL: CPT

## 2018-02-23 PROCEDURE — 84443 ASSAY THYROID STIM HORMONE: CPT

## 2018-02-23 PROCEDURE — 83036 HEMOGLOBIN GLYCOSYLATED A1C: CPT

## 2018-02-23 PROCEDURE — 36415 COLL VENOUS BLD VENIPUNCTURE: CPT

## 2018-02-23 PROCEDURE — 85025 COMPLETE CBC W/AUTO DIFF WBC: CPT

## 2018-02-23 PROCEDURE — 99396 PREV VISIT EST AGE 40-64: CPT | Performed by: FAMILY MEDICINE

## 2018-02-23 PROCEDURE — 80053 COMPREHEN METABOLIC PANEL: CPT

## 2018-02-23 RX ORDER — ZOLPIDEM TARTRATE 12.5 MG/1
12.5 TABLET, FILM COATED, EXTENDED RELEASE ORAL NIGHTLY PRN
Qty: 30 TAB | Refills: 2 | Status: SHIPPED
Start: 2018-02-23 | End: 2020-01-02 | Stop reason: SDUPTHER

## 2018-02-23 ASSESSMENT — PATIENT HEALTH QUESTIONNAIRE - PHQ9: CLINICAL INTERPRETATION OF PHQ2 SCORE: 0

## 2018-02-23 NOTE — PROGRESS NOTES
"Subjective:   Dinora Umana is a 57 y.o. female here today for annual    Patient is doing well with mood. She has gained weight, she contributes this to prednisone recently for treatment of respiratory infection.  Patient is requesting a TSH to be drawn, because she has history of abnormal thyroid. She is not currently on thyroid replacement.    About 6 months ago patient had a relapse and had a DUI. She has found a new support group, which has been able to teach her life skills, so she feels like she is improving and has no relapse. She is also trying to wean herself off of lorazepam, taken it in times of high stress, instead of once daily.    Current medicines (including changes today)  Current Outpatient Prescriptions   Medication Sig Dispense Refill   • zolpidem (AMBIEN CR) 12.5 MG CR tablet Take 1 Tab by mouth at bedtime as needed for Sleep for up to 30 days. 30 Tab 2   • LORazepam (ATIVAN) 1 MG Tab Take 1 Tab by mouth 2 times a day as needed for Anxiety for up to 30 days. 45 Tab 1   • fluoxetine (PROZAC) 40 MG capsule TAKE ONE CAPSULE BY MOUTH ONE TIME DAILY 90 Cap 3   • Multiple Vitamins-Minerals (MULTIVITAMIN PO) Take  by mouth every day.       No current facility-administered medications for this visit.      She  has a past medical history of Alcohol abuse; Allergy; Depression; Ex-smoker; Insomnia; Left wrist fracture (2013); and Thyrotoxicosis.    ROS   No chest pain, no shortness of breath, no abdominal pain       Objective:     Blood pressure 110/62, pulse 77, temperature 36.6 °C (97.8 °F), resp. rate 12, height 1.626 m (5' 4\"), weight 87.7 kg (193 lb 6.4 oz), SpO2 100 %. Body mass index is 33.2 kg/m².   Physical Exam:  Constitutional: Alert, no distress.  Skin: Warm, dry, good turgor, no rashes in visible areas.  Eye: Equal, round and reactive, conjunctiva clear, lids normal.  Respiratory: Unlabored respiratory effort, lungs clear to auscultation, no wheezes, no ronchi.  Cardiovascular: Normal S1, " S2, no murmur, no edema.  Psych: Alert and oriented x3, normal affect and mood.        Assessment and Plan:   The following treatment plan was discussed    1. Annual physical exam  Advised healthy lifestyle. Check labs and call with results.  - COMP METABOLIC PANEL; Future  - LIPID PROFILE; Future  - TSH WITH REFLEX TO FT4; Future  - CBC WITH DIFFERENTIAL; Future  - HEMOGLOBIN A1C; Future    2. Obesity (BMI 30-39.9)  - Patient identified as having weight management issue.  Appropriate orders and counseling given.    3. Primary insomnia  Rarely uses Ambien. Refill given for as needed use.  - zolpidem (AMBIEN CR) 12.5 MG CR tablet; Take 1 Tab by mouth at bedtime as needed for Sleep for up to 30 days.  Dispense: 30 Tab; Refill: 2    4. MDD (major depressive disorder), recurrent episode, mild (CMS-HCC)  Controlled. Continue fluoxetine 40 mg daily.    5. Pure hypercholesterolemia  Check labs and call with results.    6. Recovering alcoholic in remission (CMS-HCC)  In remission currently.    7. Anxiety  Advised patient to minimize lorazepam use    8. Encounter for screening mammogram for breast cancer  - MA-SCREEN MAMMO W/CAD-BILAT; Future    9. Cervical cancer screening  GYN referral done per patients request.  - REFERRAL TO GYNECOLOGY    10. Skin cancer screening  Derm referral done per patients request.  - REFERRAL TO DERMATOLOGY      Followup: Return in about 1 year (around 2/23/2019) for Annual.

## 2018-02-24 LAB
EST. AVERAGE GLUCOSE BLD GHB EST-MCNC: 123 MG/DL
HBA1C MFR BLD: 5.9 % (ref 0–5.6)

## 2018-02-26 ENCOUNTER — TELEPHONE (OUTPATIENT)
Dept: MEDICAL GROUP | Facility: MEDICAL CENTER | Age: 58
End: 2018-02-26

## 2018-02-26 NOTE — TELEPHONE ENCOUNTER
----- Message from Anthony George M.D. sent at 2/26/2018  9:02 AM PST -----  Please notify the patient that her thyroid function is normal. Her kidney function, liver function are also normal. She is prediabetic and cholesterol is up from 4 years ago. We advise to reduce sugar/carbohydrate/alcohol, eat more vegetables and lean meats such as fish/chicken/turkey. We also recommend 30 minutes of cardiovascular exercise most days of the week.  Blood counts are stable.  Anthony George M.D.

## 2018-02-27 ENCOUNTER — PATIENT MESSAGE (OUTPATIENT)
Dept: MEDICAL GROUP | Facility: MEDICAL CENTER | Age: 58
End: 2018-02-27

## 2018-02-28 NOTE — TELEPHONE ENCOUNTER
From: Dinora Umana  To: Anthony George M.D.  Sent: 2/27/2018 10:38 PM PST  Subject: Test Result Question    Good Evening Dr. George,    I realize my test results came back negative for thyroid, however, I feel the weight gain, tiredness, has more to do with retaining water. Are there other reasons why I would have this much water retention? My calves even swell and this is not normal for me. Is there something else I should be tested for or looking into? Would a diuretic be helpful?    Thank you,  Dinora Umana

## 2018-03-02 ENCOUNTER — PATIENT MESSAGE (OUTPATIENT)
Dept: MEDICAL GROUP | Facility: MEDICAL CENTER | Age: 58
End: 2018-03-02

## 2018-03-02 DIAGNOSIS — R53.82 CHRONIC FATIGUE: ICD-10-CM

## 2018-03-02 DIAGNOSIS — R63.5 WEIGHT GAIN: ICD-10-CM

## 2018-03-02 NOTE — TELEPHONE ENCOUNTER
From: Dinora Umana  To: Anthony George M.D.  Sent: 3/2/2018 12:41 PM PST  Subject: Test Result Question    Hi Dr. George,    I think I would like to follow up with Dr. Segundo for my thyroid. Even though my blood tests show in the normal range, my past blood tests show different levels. Given my body symptoms, I would like to start there. Given my job type, I am really active and don't have a desk job. I will increase my activity levels and change my diet back to my regular healthful eating habits.    Thank you for everything!    Dinora  ----- Message -----  From: Anthony George M.D.  Sent: 2/28/2018 7:25 AM PST  To: Dinora Umana  Subject: RE: Test Result Question  Dinora,    Thank you for the message.  We checked for a other reasons on why you could have swelling, kidney and liver function, which were both normal. The best thing to do is try and be more active, walking and exercise helps blood flow in the legs.  As far as diuretics go, they can help slightly by making you a little on the dehydrated side, which will make your legs look less swollen, but they might also cause your blood pressure to go down and be dizzy. If you would like to try a low dose of a diuretic, I can send one in.  If you would also like to see one of the vein clinics in [facility] that deal with swelling, we can also arrange that.    Dr. George        ----- Message -----   From: Dinora Umana   Sent: 2/27/2018 10:38 PM PST   To: Anthony George M.D.  Subject: Test Result Question    Good Evening Dr. George,    I realize my test results came back negative for thyroid, however, I feel the weight gain, tiredness, has more to do with retaining water. Are there other reasons why I would have this much water retention? My calves even swell and this is not normal for me. Is there something else I should be tested for or looking into? Would a diuretic be helpful?    Thank you,  Dinora Umana

## 2018-04-11 ENCOUNTER — OFFICE VISIT (OUTPATIENT)
Dept: ENDOCRINOLOGY | Facility: MEDICAL CENTER | Age: 58
End: 2018-04-11
Payer: COMMERCIAL

## 2018-04-11 VITALS
WEIGHT: 192 LBS | BODY MASS INDEX: 32.78 KG/M2 | SYSTOLIC BLOOD PRESSURE: 104 MMHG | OXYGEN SATURATION: 95 % | HEART RATE: 88 BPM | HEIGHT: 64 IN | DIASTOLIC BLOOD PRESSURE: 70 MMHG

## 2018-04-11 DIAGNOSIS — R63.5 EXCESSIVE WEIGHT GAIN: ICD-10-CM

## 2018-04-11 DIAGNOSIS — R53.82 CHRONIC FATIGUE: ICD-10-CM

## 2018-04-11 DIAGNOSIS — R53.1 WEAKNESS GENERALIZED: Primary | ICD-10-CM

## 2018-04-11 PROCEDURE — 99244 OFF/OP CNSLTJ NEW/EST MOD 40: CPT | Performed by: INTERNAL MEDICINE

## 2018-04-11 RX ORDER — LORAZEPAM 1 MG/1
1 TABLET ORAL EVERY 4 HOURS PRN
COMMUNITY
End: 2018-07-29 | Stop reason: SDUPTHER

## 2018-04-12 NOTE — PROGRESS NOTES
Chief Complaint   Patient presents with   • Fatigue     weight gain            CHIEF COMPLAINT:      Endocrine evaluation requested by Dr. George for this 57 year old lady with unexplained weight gain, fatigue and other symptoms.     PRESENT ILLNESS:     The patient indicates that I had seen her sometime years ago.  I do not have records to refer to.  She took thyroid hormone for a while but then things seemed to be okay and that was discontinued and I have not seen her since.  She was doing relatively well until December of last year.  At that time, she had a very persistent cough and chest congestion.  At one point, she was given a short course of prednisone.  That seemed to resolve the pulmonary symptoms which have not recurred.  However since that exposure to prednisone, she has not done well at all.  She has been gaining weight.  Last December, her weight was measured at 186 pounds and she is currently 192 pounds.  Last May her weight was measured at 186 pounds.  So there has not been a long term excessive weight gain.  She does have fluid retention that comes and goes.  She tries to be aware of salt intake.  But she does have a sense that she is retaining fluid at times.  She has chronic fatigue and feels her muscle strength and endurance has deteriorated as well.  She has anxiety and panic episodes and not sleeping well.      She does not have hypertension.  She is no longer taking thyroid hormone.  Her medications are lorazepam, fluoxetine and Ambien p.r.n.    On my examination she looks very healthy apart from excessive weight.  I don’t see any cushingoid features but that might come later even if she does have Cushings.  Her thyroid gland is not enlarged and is asymptomatic.  She does not have striae or purpura.  No hirsutism.    She has recent chemistries that are generally good, a mild anemia with hemoglobin 11.7 but that seems to be about where she stays chronically.  In 2013 she was 11.8 and 2015  hemoglobin was 12.6 so this is not dramatically different for her.  Her general chemistries are in good range.  Her FBS is 85 and A1c is 5.9.  Cholesterol slightly elevated at 206 with .  Apart from that her chemistry panel is unremarkable.  A solitary TSH level done last month was 0.6.  I am going to do a general metabolic evaluation.  We will reassess her thyroid status and include T4 and T3 as well as well as pituitary screen and I will screen for Cushing’s disease.  She doesn’t have it clinically but if it is early and mild she may not have the manifestations we generally expect.     I will follow those results by phone.  If all is normal, my intent will be to refer her to Dr. Rufina Gaitan’s metabolic obesity clinic and have her get us an opinion and recommendations.  The patient is in agreement.       ROS:  Anxiety  Panic attacks  Weight gain  Intermittent edema  Chronic fatigue  Muscle weakness      Allergies:   Allergies   Allergen Reactions   • Augmentin Hives and Rash   • Erythromycin Nausea   • Morphine Vomiting and Nausea       Current medicines including changes today:  Current Outpatient Prescriptions   Medication Sig Dispense Refill   • LORazepam (ATIVAN) 1 MG Tab Take 1 mg by mouth every four hours as needed for Anxiety.     • Zolpidem Tartrate (AMBIEN CR PO) Take  by mouth as needed.     • fluoxetine (PROZAC) 40 MG capsule TAKE ONE CAPSULE BY MOUTH ONE TIME DAILY 90 Cap 3   • Multiple Vitamins-Minerals (MULTIVITAMIN PO) Take  by mouth every day.       No current facility-administered medications for this visit.         Past Medical History:   Diagnosis Date   • Alcohol abuse    • Allergy    • Depression    • Ex-smoker    • Insomnia    • Left wrist fracture 2013   • Thyrotoxicosis     Graves' disease / TR ab +, resolved     Family history           Sister takes thyroid hormone           No other related family history    Social history         The patient does not smoke cigarettes or  "drink alcohol and does not use recreational drugs    PHYSICAL EXAM:    /70   Pulse 88   Ht 1.626 m (5' 4\")   Wt 87.1 kg (192 lb)   SpO2 95%   BMI 32.96 kg/m²      Gen.   Obese, no cushingoid features or acromegalic appearance    Skin   appropriate for sex and age             No purpura or abdominal striae consistent with Cushing's    HEENT  unremarkable    Neck   thyroid gland is difficult to palpate. I do not think it's enlarged. Her supraclavicular and dorsal fat pads are not particularly problem    Heart  regular    Extremities  no edema    Neuro  gait and station normal , proximal muscle strength is good    Psych  appropriate      ASSESSMENT AND RECOMMENDATIONS    1. Excessive weight gain           Consider Cushing's disease. Doubt hypothyroidism  - SALIVARY CORTISOL,MS  - URINE CORTISOL 24 HR, ICMA; Future  - CORTISOL; Future  - ACTH; Future  - DHEA; Future  - IGF-1 SOMATOMEDIN; Future  - FSH; Future  - FREE THYROXINE; Future  - TSH; Future    2. Chronic fatigue    - FSH; Future  - FREE THYROXINE; Future  - TSH; Future    3. Weakness generalized    - FSH; Future  - VITAMIN D,25 HYDROXY; Future      DISPOSITION: Follow-up of the above screening procedures by telephone or my chart.                            If no significant abnormalities consider referral to Dr. Rufina Gaitan's clinic. She is in agreement       Dylan Segundo M.D.    Copies to: Anthony George M.D. [phone]  "

## 2018-04-14 ENCOUNTER — HOSPITAL ENCOUNTER (OUTPATIENT)
Dept: LAB | Facility: MEDICAL CENTER | Age: 58
End: 2018-04-14
Attending: INTERNAL MEDICINE
Payer: COMMERCIAL

## 2018-04-14 DIAGNOSIS — R53.82 CHRONIC FATIGUE: ICD-10-CM

## 2018-04-14 DIAGNOSIS — R63.5 EXCESSIVE WEIGHT GAIN: ICD-10-CM

## 2018-04-14 DIAGNOSIS — R53.1 WEAKNESS GENERALIZED: ICD-10-CM

## 2018-04-14 LAB
25(OH)D3 SERPL-MCNC: 23 NG/ML (ref 30–100)
CORTIS SERPL-MCNC: 8 UG/DL (ref 0–23)
FSH SERPL-ACNC: 53.1 MIU/ML
T4 FREE SERPL-MCNC: 0.86 NG/DL (ref 0.53–1.43)
TSH SERPL DL<=0.005 MIU/L-ACNC: 2.31 UIU/ML (ref 0.38–5.33)

## 2018-04-14 PROCEDURE — 82533 TOTAL CORTISOL: CPT

## 2018-04-14 PROCEDURE — 82626 DEHYDROEPIANDROSTERONE: CPT

## 2018-04-14 PROCEDURE — 36415 COLL VENOUS BLD VENIPUNCTURE: CPT

## 2018-04-14 PROCEDURE — 82024 ASSAY OF ACTH: CPT

## 2018-04-14 PROCEDURE — 84305 ASSAY OF SOMATOMEDIN: CPT

## 2018-04-14 PROCEDURE — 82306 VITAMIN D 25 HYDROXY: CPT

## 2018-04-14 PROCEDURE — 83001 ASSAY OF GONADOTROPIN (FSH): CPT

## 2018-04-14 PROCEDURE — 84443 ASSAY THYROID STIM HORMONE: CPT

## 2018-04-14 PROCEDURE — 84439 ASSAY OF FREE THYROXINE: CPT

## 2018-04-17 LAB
ACTH PLAS-MCNC: 15 PG/ML (ref 6–58)
IGF-I SERPL-MCNC: 76 NG/ML (ref 49–234)
IGF-I Z-SCORE SERPL: -1.1

## 2018-04-18 LAB — DHEA SERPL-MCNC: 1.17 NG/ML (ref 0.63–4.7)

## 2018-04-28 ENCOUNTER — TELEPHONE (OUTPATIENT)
Dept: ENDOCRINOLOGY | Facility: MEDICAL CENTER | Age: 58
End: 2018-04-28

## 2018-04-28 DIAGNOSIS — E66.9 OBESITY (BMI 30-39.9): ICD-10-CM

## 2018-04-28 DIAGNOSIS — E78.00 PURE HYPERCHOLESTEROLEMIA: ICD-10-CM

## 2018-04-28 NOTE — TELEPHONE ENCOUNTER
Telephone conversation with patient    Basic lab tests so far are okay. Morning cortisol 8 and ACTH 15. She has not done the 24 hour urine or midnight saliva test yet which I think she should. Those are the 2 most important test to rule out Cushing's. I don't think she has but it's important diagnosis to rule out.    I'll put a referral in to Dr. Gaitan's clinic. She is interested in that consultation.    Dylan Segundo M.D.

## 2018-04-30 ENCOUNTER — HOSPITAL ENCOUNTER (OUTPATIENT)
Facility: MEDICAL CENTER | Age: 58
End: 2018-04-30
Attending: INTERNAL MEDICINE
Payer: COMMERCIAL

## 2018-04-30 DIAGNOSIS — R63.5 EXCESSIVE WEIGHT GAIN: ICD-10-CM

## 2018-04-30 PROCEDURE — 82530 CORTISOL FREE: CPT

## 2018-05-03 LAB
ANNOTATION COMMENT IMP: NORMAL
COLLECT DURATION TIME SPEC: 24 HRS
CORTIS F 24H UR HPLC-MCNC: 7.3 UG/L
CORTIS F 24H UR-MRATE: 23.4 UG/D
CORTIS F/CREAT 24H UR: 30.42 UG/G CRT
CREAT 24H UR-MCNC: 24 MG/DL
CREAT 24H UR-MRATE: 768 MG/D (ref 500–1400)
SPECIMEN VOL ?TM UR: 3200 ML

## 2018-05-09 ENCOUNTER — OFFICE VISIT (OUTPATIENT)
Dept: MEDICAL GROUP | Facility: PHYSICIAN GROUP | Age: 58
End: 2018-05-09
Payer: COMMERCIAL

## 2018-05-09 VITALS
HEIGHT: 64 IN | BODY MASS INDEX: 32.44 KG/M2 | WEIGHT: 190 LBS | DIASTOLIC BLOOD PRESSURE: 70 MMHG | HEART RATE: 74 BPM | OXYGEN SATURATION: 96 % | TEMPERATURE: 97.3 F | SYSTOLIC BLOOD PRESSURE: 108 MMHG | RESPIRATION RATE: 14 BRPM

## 2018-05-09 DIAGNOSIS — J30.1 SEASONAL ALLERGIC RHINITIS DUE TO POLLEN: ICD-10-CM

## 2018-05-09 DIAGNOSIS — Z12.39 SCREENING FOR BREAST CANCER: ICD-10-CM

## 2018-05-09 PROCEDURE — 99214 OFFICE O/P EST MOD 30 MIN: CPT | Mod: 25 | Performed by: FAMILY MEDICINE

## 2018-05-09 PROCEDURE — 99999 PR NO CHARGE: CPT | Performed by: FAMILY MEDICINE

## 2018-05-09 RX ORDER — TRIAMCINOLONE ACETONIDE 40 MG/ML
40 INJECTION, SUSPENSION INTRA-ARTICULAR; INTRAMUSCULAR ONCE
Status: COMPLETED | OUTPATIENT
Start: 2018-05-09 | End: 2018-05-09

## 2018-05-09 RX ADMIN — TRIAMCINOLONE ACETONIDE 40 MG: 40 INJECTION, SUSPENSION INTRA-ARTICULAR; INTRAMUSCULAR at 11:00

## 2018-05-09 NOTE — PROGRESS NOTES
Chief Complaint   Patient presents with   • Allergic Rhinitis       HISTORY OF PRESENT ILLNESS: Patient is a 57 y.o. female established patient here today for the following concerns:    1. Seasonal allergic rhinitis due to pollen  Here today for possible kenalog shot.  She reports that last shot was over 1 year ago and effective.  She has been trying to ride it out by using zyrtec and nasal steroids with incomplete resolution of symptoms.    - Triamcinolone Acetonide    2. Screening for breast cancer  Due for mammogram.        Past Medical, Social, and Family history reviewed and updated in EPIC    Allergies:Augmentin; Erythromycin; and Morphine    Current Outpatient Prescriptions   Medication Sig Dispense Refill   • LORazepam (ATIVAN) 1 MG Tab Take 1 mg by mouth every four hours as needed for Anxiety.     • Zolpidem Tartrate (AMBIEN CR PO) Take  by mouth as needed.     • fluoxetine (PROZAC) 40 MG capsule TAKE ONE CAPSULE BY MOUTH ONE TIME DAILY 90 Cap 3   • Multiple Vitamins-Minerals (MULTIVITAMIN PO) Take  by mouth every day.       Current Facility-Administered Medications   Medication Dose Route Frequency Provider Last Rate Last Dose   • triamcinolone acetonide (KENALOG-40) injection 40 mg  40 mg Intramuscular Once Pinky Villalta M.D.             ROS:  Review of Systems   Constitutional: Negative for fever, chills, weight loss and malaise/fatigue.   HENT: Negative for ear pain, nosebleeds, congestion, sore throat and neck pain.    Eyes: Negative for blurred vision.   Respiratory: Negative for cough, sputum production, shortness of breath and wheezing.    Cardiovascular: Negative for chest pain, palpitations,  and leg swelling.   Gastrointestinal: Negative for heartburn, nausea, vomiting, diarrhea and abdominal pain.   Genitourinary: Negative for dysuria, urgency and frequency.   Musculoskeletal: Negative for myalgias, back pain and joint pain.   Skin: Negative for rash and itching.   Neurological: Negative for  "dizziness, tingling, tremors, sensory change, focal weakness and headaches.   Endo/Heme/Allergies: Does not bruise/bleed easily.   Psychiatric/Behavioral: Negative for depression, anxiety, suicidal ideas, insomnia and memory loss.      Exam:  Blood pressure 108/70, pulse 74, temperature 36.3 °C (97.3 °F), resp. rate 14, height 1.626 m (5' 4\"), weight 86.2 kg (190 lb), SpO2 96 %.    General:  Well nourished, well developed in NAD  Head is grossly normal.  Neck: Supple without JVD   Pulmonary:  Normal effort.   Cardiovascular: Regular rate  Extremities: no clubbing, cyanosis, or edema.  Psych: affect appropriate      Please note that this dictation was created using voice recognition software. I have made every reasonable attempt to correct obvious errors, but I expect that there are errors of grammar and possibly content that I did not discover before finalizing the note.    Assessment/Plan:  1. Seasonal allergic rhinitis due to pollen  Counseled on risk and benefit of the steroids.  Continue antihistamine therapy.   - triamcinolone acetonide (KENALOG-40) injection 40 mg; 1 mL by Intramuscular route Once.    2. Screening for breast cancer    - MA-MAMMO SCREENING BILAT W/MILTON W/CAD; Future            "

## 2018-07-06 ENCOUNTER — APPOINTMENT (RX ONLY)
Dept: URBAN - METROPOLITAN AREA CLINIC 4 | Facility: CLINIC | Age: 58
Setting detail: DERMATOLOGY
End: 2018-07-06

## 2018-07-06 DIAGNOSIS — D22 MELANOCYTIC NEVI: ICD-10-CM

## 2018-07-06 DIAGNOSIS — D18.0 HEMANGIOMA: ICD-10-CM

## 2018-07-06 DIAGNOSIS — L82.1 OTHER SEBORRHEIC KERATOSIS: ICD-10-CM

## 2018-07-06 DIAGNOSIS — L81.4 OTHER MELANIN HYPERPIGMENTATION: ICD-10-CM

## 2018-07-06 DIAGNOSIS — L82.0 INFLAMED SEBORRHEIC KERATOSIS: ICD-10-CM

## 2018-07-06 PROBLEM — D22.61 MELANOCYTIC NEVI OF RIGHT UPPER LIMB, INCLUDING SHOULDER: Status: ACTIVE | Noted: 2018-07-06

## 2018-07-06 PROBLEM — D22.5 MELANOCYTIC NEVI OF TRUNK: Status: ACTIVE | Noted: 2018-07-06

## 2018-07-06 PROBLEM — F32.9 MAJOR DEPRESSIVE DISORDER, SINGLE EPISODE, UNSPECIFIED: Status: ACTIVE | Noted: 2018-07-06

## 2018-07-06 PROBLEM — F41.9 ANXIETY DISORDER, UNSPECIFIED: Status: ACTIVE | Noted: 2018-07-06

## 2018-07-06 PROBLEM — D22.71 MELANOCYTIC NEVI OF RIGHT LOWER LIMB, INCLUDING HIP: Status: ACTIVE | Noted: 2018-07-06

## 2018-07-06 PROBLEM — D18.01 HEMANGIOMA OF SKIN AND SUBCUTANEOUS TISSUE: Status: ACTIVE | Noted: 2018-07-06

## 2018-07-06 PROBLEM — D22.72 MELANOCYTIC NEVI OF LEFT LOWER LIMB, INCLUDING HIP: Status: ACTIVE | Noted: 2018-07-06

## 2018-07-06 PROBLEM — L57.0 ACTINIC KERATOSIS: Status: ACTIVE | Noted: 2018-07-06

## 2018-07-06 PROBLEM — D22.62 MELANOCYTIC NEVI OF LEFT UPPER LIMB, INCLUDING SHOULDER: Status: ACTIVE | Noted: 2018-07-06

## 2018-07-06 PROCEDURE — ? COUNSELING

## 2018-07-06 PROCEDURE — ? SUNSCREEN RECOMMENDATIONS

## 2018-07-06 PROCEDURE — 99213 OFFICE O/P EST LOW 20 MIN: CPT | Mod: 25

## 2018-07-06 PROCEDURE — ? LIQUID NITROGEN

## 2018-07-06 PROCEDURE — 17110 DESTRUCTION B9 LES UP TO 14: CPT

## 2018-07-06 ASSESSMENT — LOCATION DETAILED DESCRIPTION DERM
LOCATION DETAILED: RIGHT RIB CAGE
LOCATION DETAILED: LEFT INFERIOR ANTERIOR NECK
LOCATION DETAILED: RIGHT RADIAL DORSAL HAND
LOCATION DETAILED: RIGHT LATERAL BUCCAL CHEEK
LOCATION DETAILED: MIDDLE STERNUM
LOCATION DETAILED: LEFT CENTRAL EYEBROW
LOCATION DETAILED: SUPERIOR MID FOREHEAD
LOCATION DETAILED: LEFT SUPERIOR MEDIAL UPPER BACK
LOCATION DETAILED: LEFT MID TEMPLE
LOCATION DETAILED: LEFT ULNAR DORSAL HAND
LOCATION DETAILED: LEFT FOREHEAD
LOCATION DETAILED: RIGHT PROXIMAL DORSAL FOREARM
LOCATION DETAILED: LEFT PROXIMAL DORSAL FOREARM
LOCATION DETAILED: RIGHT SUPERIOR LATERAL MALAR CHEEK
LOCATION DETAILED: RIGHT CENTRAL MALAR CHEEK
LOCATION DETAILED: LEFT INFERIOR LATERAL MALAR CHEEK
LOCATION DETAILED: RIGHT LATERAL MALAR CHEEK
LOCATION DETAILED: PERIUMBILICAL SKIN
LOCATION DETAILED: RIGHT LATERAL SUPERIOR CHEST
LOCATION DETAILED: SUPERIOR THORACIC SPINE
LOCATION DETAILED: RIGHT SUPERIOR MEDIAL MIDBACK
LOCATION DETAILED: LEFT PROXIMAL POSTERIOR UPPER ARM
LOCATION DETAILED: LEFT ANTERIOR PROXIMAL THIGH
LOCATION DETAILED: LEFT CENTRAL TEMPLE
LOCATION DETAILED: LEFT CENTRAL MALAR CHEEK
LOCATION DETAILED: RIGHT DISTAL POSTERIOR UPPER ARM
LOCATION DETAILED: RIGHT ANTERIOR PROXIMAL THIGH

## 2018-07-06 ASSESSMENT — LOCATION SIMPLE DESCRIPTION DERM
LOCATION SIMPLE: LEFT FOREARM
LOCATION SIMPLE: LEFT HAND
LOCATION SIMPLE: RIGHT THIGH
LOCATION SIMPLE: LEFT ANTERIOR NECK
LOCATION SIMPLE: RIGHT FOREARM
LOCATION SIMPLE: LEFT FOREHEAD
LOCATION SIMPLE: UPPER BACK
LOCATION SIMPLE: LEFT THIGH
LOCATION SIMPLE: LEFT EYEBROW
LOCATION SIMPLE: ABDOMEN
LOCATION SIMPLE: SUPERIOR FOREHEAD
LOCATION SIMPLE: RIGHT POSTERIOR UPPER ARM
LOCATION SIMPLE: CHEST
LOCATION SIMPLE: RIGHT HAND
LOCATION SIMPLE: LEFT CHEEK
LOCATION SIMPLE: RIGHT CHEEK
LOCATION SIMPLE: LEFT UPPER BACK
LOCATION SIMPLE: LEFT POSTERIOR UPPER ARM
LOCATION SIMPLE: LEFT TEMPLE
LOCATION SIMPLE: RIGHT LOWER BACK

## 2018-07-06 ASSESSMENT — LOCATION ZONE DERM
LOCATION ZONE: TRUNK
LOCATION ZONE: HAND
LOCATION ZONE: NECK
LOCATION ZONE: LEG
LOCATION ZONE: ARM
LOCATION ZONE: FACE

## 2018-07-06 NOTE — PROCEDURE: LIQUID NITROGEN
Render Post-Care Instructions In Note?: no
Detail Level: Detailed
Post-Care Instructions: I reviewed with the patient in detail post-care instructions. Patient is to wear sunprotection, and avoid picking at any of the treated lesions. Pt may apply Vaseline to crusted or scabbing areas.
Medical Necessity Clause: This procedure was medically necessary because the lesions that were treated were:
Medical Necessity Information: It is in your best interest to select a reason for this procedure from the list below. All of these items fulfill various CMS LCD requirements except the new and changing color options.
Consent: The patient's consent was obtained including but not limited to risks of crusting, scabbing, blistering, scarring, darker or lighter pigmentary change, recurrence, incomplete removal and infection.

## 2018-07-29 DIAGNOSIS — F41.9 ANXIETY: ICD-10-CM

## 2018-07-30 RX ORDER — LORAZEPAM 1 MG/1
1 TABLET ORAL 2 TIMES DAILY PRN
Qty: 45 TAB | Refills: 5 | Status: SHIPPED
Start: 2018-07-30 | End: 2020-01-02 | Stop reason: SDUPTHER

## 2018-10-25 DIAGNOSIS — F33.0 MDD (MAJOR DEPRESSIVE DISORDER), RECURRENT EPISODE, MILD (HCC): ICD-10-CM

## 2018-10-25 RX ORDER — FLUOXETINE HYDROCHLORIDE 40 MG/1
CAPSULE ORAL
Qty: 90 CAP | Refills: 3 | Status: SHIPPED | OUTPATIENT
Start: 2018-10-25 | End: 2020-01-02 | Stop reason: SDUPTHER

## 2020-01-02 ENCOUNTER — OFFICE VISIT (OUTPATIENT)
Dept: MEDICAL GROUP | Facility: MEDICAL CENTER | Age: 60
End: 2020-01-02

## 2020-01-02 VITALS
TEMPERATURE: 97.7 F | SYSTOLIC BLOOD PRESSURE: 112 MMHG | BODY MASS INDEX: 29.41 KG/M2 | HEART RATE: 61 BPM | OXYGEN SATURATION: 97 % | DIASTOLIC BLOOD PRESSURE: 72 MMHG | WEIGHT: 166 LBS | HEIGHT: 63 IN

## 2020-01-02 DIAGNOSIS — B35.1 ONYCHOMYCOSIS OF TOENAIL: ICD-10-CM

## 2020-01-02 DIAGNOSIS — F51.01 PRIMARY INSOMNIA: ICD-10-CM

## 2020-01-02 DIAGNOSIS — F41.9 ANXIETY: ICD-10-CM

## 2020-01-02 DIAGNOSIS — F33.0 MDD (MAJOR DEPRESSIVE DISORDER), RECURRENT EPISODE, MILD (HCC): ICD-10-CM

## 2020-01-02 DIAGNOSIS — F10.21 RECOVERING ALCOHOLIC IN REMISSION (HCC): ICD-10-CM

## 2020-01-02 PROCEDURE — 99214 OFFICE O/P EST MOD 30 MIN: CPT | Performed by: FAMILY MEDICINE

## 2020-01-02 RX ORDER — ZOLPIDEM TARTRATE 12.5 MG/1
12.5 TABLET, FILM COATED, EXTENDED RELEASE ORAL NIGHTLY PRN
Qty: 30 TAB | Refills: 1 | Status: SHIPPED | OUTPATIENT
Start: 2020-01-02 | End: 2020-02-01

## 2020-01-02 RX ORDER — LORAZEPAM 1 MG/1
1 TABLET ORAL 2 TIMES DAILY PRN
Qty: 60 TAB | Refills: 2 | Status: SHIPPED | OUTPATIENT
Start: 2020-01-02 | End: 2020-02-01

## 2020-01-02 RX ORDER — TERBINAFINE HYDROCHLORIDE 250 MG/1
250 TABLET ORAL DAILY
Qty: 90 TAB | Refills: 0 | Status: SHIPPED | OUTPATIENT
Start: 2020-01-02 | End: 2021-04-14

## 2020-01-02 RX ORDER — FLUOXETINE HYDROCHLORIDE 40 MG/1
40 CAPSULE ORAL DAILY
Qty: 90 CAP | Refills: 3 | Status: SHIPPED | OUTPATIENT
Start: 2020-01-02

## 2020-01-02 NOTE — ASSESSMENT & PLAN NOTE
Patient takes lorazepam 1 mg daily.  She has had problems with alcohol in the past.  She had an episode of binge drinking for 3 days in May, but got into the ER to be treated.  She is attending AA regularly and has been sober since.  She states that Lorazepam helps her mind calm down so she can focus and she does not have any alcohol cravings when taking it.

## 2020-01-02 NOTE — ASSESSMENT & PLAN NOTE
Patient's mood is controlled with fluoxetine 40 mg daily.  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

## 2020-01-02 NOTE — ASSESSMENT & PLAN NOTE
Occasionally patient requires Ambien CR 12.5 mg at night because she is unable to turn her mind off and cannot get any sleep on occasion.

## 2020-01-02 NOTE — ASSESSMENT & PLAN NOTE
Patient is complaining of thickened toenail with white discharge under the nail.  She was seen by dermatologist and prescribed Penlac, although no improvement in her nail.  She understands this is cosmetic but would like to have it treated.

## 2020-01-02 NOTE — PROGRESS NOTES
Subjective:   Dinora Umana is a 59 y.o. female here today for anxiety    MDD (major depressive disorder), recurrent episode, mild  Patient's mood is controlled with fluoxetine 40 mg daily.  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    Insomnia  Occasionally patient requires Ambien CR 12.5 mg at night because she is unable to turn her mind off and cannot get any sleep on occasion.    Anxiety  Patient takes lorazepam 1 mg daily.  She has had problems with alcohol in the past.  She had an episode of binge drinking for 3 days in May, but got into the ER to be treated.  She is attending AA regularly and has been sober since.  She states that Lorazepam helps her mind calm down so she can focus and she does not have any alcohol cravings when taking it.    Recovering alcoholic in remission  Patient is actively involved in AA and has not had alcohol in the last 7 months.    Onychomycosis of toenail  Patient is complaining of thickened toenail with white discharge under the nail.  She was seen by dermatologist and prescribed Penlac, although no improvement in her nail.  She understands this is cosmetic but would like to have it treated.         Current medicines (including changes today)  Current Outpatient Medications   Medication Sig Dispense Refill   • LORazepam (ATIVAN) 1 MG Tab Take 1 Tab by mouth 2 times a day as needed for Anxiety for up to 30 days. 60 Tab 2   • zolpidem (AMBIEN CR) 12.5 MG CR tablet Take 1 Tab by mouth at bedtime as needed for Sleep for up to 30 days. 30 Tab 1   • fluoxetine (PROZAC) 40 MG capsule Take 1 Cap by mouth every day. 90 Cap 3   • terbinafine (LAMISIL) 250 MG Tab Take 1 Tab by mouth every day. 90 Tab 0   • Multiple Vitamins-Minerals (MULTIVITAMIN PO) Take  by mouth every day.       No current facility-administered medications for this visit.      She  has a past medical history of Alcohol abuse,  "Allergy, Depression, Ex-smoker, Insomnia, Left wrist fracture (2013), and Thyrotoxicosis.    ROS   No chest pain, no shortness of breath       Objective:     /72 (BP Location: Right arm, Patient Position: Sitting)   Pulse 61   Temp 36.5 °C (97.7 °F)   Ht 1.6 m (5' 3\")   Wt 75.3 kg (166 lb)   SpO2 97%  Body mass index is 29.41 kg/m².   Physical Exam:  Constitutional: Alert, no distress.  Skin: Warm, dry, good turgor, no rashes in visible areas.  Eye: Equal, round and reactive, conjunctiva clear, lids normal.  Psych: Alert and oriented x3, normal affect and mood.  Great toenails: Thickened discolored toenails with white flaking underneath the nail.      Assessment and Plan:   The following treatment plan was discussed    1. MDD (major depressive disorder), recurrent episode, mild (HCC)  Controlled.  Continue fluoxetine 40 mg daily.  - fluoxetine (PROZAC) 40 MG capsule; Take 1 Cap by mouth every day.  Dispense: 90 Cap; Refill: 3    2. Anxiety  Controlled.  Discussed risk and benefit of lorazepam.  Discussed potential harm with lorazepam especially when mixing with alcohol.  Discussed not driving with lorazepam.  Lorazepam is helping keep patient off alcohol as it calms down her mind, which she was using alcohol to treat.  Follow-up in 6 months  - LORazepam (ATIVAN) 1 MG Tab; Take 1 Tab by mouth 2 times a day as needed for Anxiety for up to 30 days.  Dispense: 60 Tab; Refill: 2    3. Recovering alcoholic in remission (HCC)  Doing well.  Continue with AA.    4. Primary insomnia  Prescription for Ambien.  Advised patient to not mix Ambien and Lorazepam.  Advised patient not to mix Ambien and alcohol.  Use Ambien sparingly.  - zolpidem (AMBIEN CR) 12.5 MG CR tablet; Take 1 Tab by mouth at bedtime as needed for Sleep for up to 30 days.  Dispense: 30 Tab; Refill: 1    5. Onychomycosis of toenail  Start patient Lamisil 250 mg daily.  Hepatic function ordered for 1 month.  - terbinafine (LAMISIL) 250 MG Tab; Take 1 " Tab by mouth every day.  Dispense: 90 Tab; Refill: 0  - HEPATIC FUNCTION PANEL; Future      Followup: Return in about 6 months (around 7/2/2020) for Lorazepam refill.

## 2020-07-06 DIAGNOSIS — F51.01 PRIMARY INSOMNIA: ICD-10-CM

## 2020-07-06 DIAGNOSIS — F41.9 ANXIETY: ICD-10-CM

## 2020-07-06 RX ORDER — LORAZEPAM 1 MG/1
TABLET ORAL
Qty: 60 TAB | Refills: 5 | Status: SHIPPED | OUTPATIENT
Start: 2020-07-06 | End: 2020-08-05

## 2020-07-06 RX ORDER — ZOLPIDEM TARTRATE 12.5 MG/1
TABLET, FILM COATED, EXTENDED RELEASE ORAL
Qty: 30 TAB | Refills: 5 | Status: SHIPPED | OUTPATIENT
Start: 2020-07-06 | End: 2020-08-05

## 2021-02-24 NOTE — MR AVS SNAPSHOT
"Dinora Umana   2017 10:45 AM   Office Visit   MRN: [id number]    Department:  [facility]   Dept Phone:  [phone]    Description:  Female : 1960   Provider:  SMITA Kinney M.D.           Reason for Visit     URI cough, post nasal drip, causing vomiting, x2mths      Allergies as of 2017     Allergen Noted Reactions    Augmentin 2008   Hives, Rash    Morphine 2015   Vomiting, Nausea      You were diagnosed with     Cough   [786.2.ICD-9-CM]         Vital Signs     Blood Pressure Pulse Temperature Respirations Height Weight    110/82 mmHg 95 36.7 °C (98.1 °F) 17 1.626 m (5' 4.02\") 86.183 kg (190 lb)    Body Mass Index Oxygen Saturation Smoking Status             32.60 kg/m2 96% Former Smoker         Basic Information     Date Of Birth Sex Race Ethnicity Preferred Language    1960 Female White Non- English      Problem List              ICD-10-CM Priority Class Noted - Resolved    MDD (major depressive disorder), recurrent episode, mild (CMS-HCC) F33.0   Unknown - Present    Insomnia G47.00   Unknown - Present    Numbness and tingling in left hand R20.2   2014 - Present    Anxiety F41.9   2014 - Present    Recovering alcoholic in remission (CMS-HCC) F10.21   2014 - Present    Seasonal allergies J30.2   2014 - Present    Hyperlipidemia E78.5   10/29/2014 - Present    Diverticulosis of colon K57.30   2014 - Present    Bilateral hearing loss H91.93   2015 - Present    Obesity (BMI 30-39.9) E66.9   2016 - Present      Health Maintenance        Date Due Completion Dates    MAMMOGRAM 2015, 2013, 2011, 2010, 2010, 2006    PAP SMEAR 2016, 2011    IMM DTaP/Tdap/Td Vaccine (2 - Td) 10/29/2024 10/29/2014, 2010    COLONOSCOPY 2024            Current Immunizations     Influenza TIV (IM) 2014, 2011    Influenza Vaccine Quad Inj (Preserved) " Pt ambulates to bathroom and back to bed with assist x1  Gait steady  Pt provided new sheets and made comfortable in bed  Side rails adjusted for safety  Call bell within reach and pt instructed to ring with any needs  Lights dimmed and pt resting comfortably        Swetha Petersen RN  02/24/21 1007 11/11/2016, 10/29/2014  8:47 AM    TD Vaccine 11/1/2010  8:30 PM    Tdap Vaccine 10/29/2014  9:13 AM      Below and/or attached are the medications your provider expects you to take. Review all of your home medications and newly ordered medications with your provider and/or pharmacist. Follow medication instructions as directed by your provider and/or pharmacist. Please keep your medication list with you and share with your provider. Update the information when medications are discontinued, doses are changed, or new medications (including over-the-counter products) are added; and carry medication information at all times in the event of emergency situations     Allergies:  AUGMENTIN - Hives,Rash     MORPHINE - Vomiting,Nausea               Medications  Valid as of: January 18, 2017 - 11:39 AM    Generic Name Brand Name Tablet Size Instructions for use    Doxycycline Hyclate (Tab) VIBRAMYCIN 100 MG Take 1 Tab by mouth 2 times a day.        FLUoxetine HCl (Cap) PROZAC 40 MG Take 1 Cap by mouth every day.        Fluticasone Propionate (Suspension) FLONASE 50 MCG/ACT Spray 2 Sprays in nose every day.        Guaifenesin-Codeine (Solution) ROBITUSSIN -10 mg/5mL Take 5 mL by mouth every four hours as needed.        LORazepam (Tab) ATIVAN 1 MG Take 1 Tab by mouth 2 times a day as needed for Anxiety.        Multiple Vitamins-Minerals   Take  by mouth every day.        Zolpidem Tartrate (Tab CR) AMBIEN CR 12.5 MG Take 1 Tab by mouth at bedtime as needed for Sleep.        .                 Medicines prescribed today were sent to:     [id number] [place] [facility] [address] [facility]    [address] [address]    Phone: [phone] Fax: [phone]    Open 24 Hours?: No      Medication refill instructions:       If your prescription bottle indicates you have medication refills left, it is not necessary to call your provider’s office. Please contact your pharmacy and they will refill your  medication.    If your prescription bottle indicates you do not have any refills left, you may request refills at any time through one of the following ways: The online [employer] system (except Urgent Care), by calling your provider’s office, or by asking your pharmacy to contact your provider’s office with a refill request. Medication refills are processed only during regular business hours and may not be available until the next business day. Your provider may request additional information or to have a follow-up visit with you prior to refilling your medication.   *Please Note: Medication refills are assigned a new Rx number when refilled electronically. Your pharmacy may indicate that no refills were authorized even though a new prescription for the same medication is available at the pharmacy. Please request the medicine by name with the pharmacy before contacting your provider for a refill.           [employer] Access Code: [email or web address]  Expires: 2/10/2017  3:34 PM    [employer]  A secure, online tool to manage your health information     [employer]® is a secure, online tool that connects you to your personalized health information from the privacy of your home -- day or night - making it very easy for you to manage your healthcare. Once the activation process is completed, you can even access your medical information using the [employer] joshua, which is available for free in the Apple Joshua store or Google Play store.     [employer] provides the following levels of access (as shown below):   My Chart Features   [facility] Doctor [facility]  Specialists [facility]  Urgent  Care Non-[facility]  Primary Care  Doctor   Email your healthcare team securely and privately 24/7 X X X    Manage appointments: schedule your next appointment; view details of past/upcoming appointments X      Request prescription refills. X      View recent personal medical records, including lab and immunizations X X X X   View health  record, including health history, allergies, medications X X X X   Read reports about your outpatient visits, procedures, consult and ER notes X X X X   See your discharge summary, which is a recap of your hospital and/or ER visit that includes your diagnosis, lab results, and care plan. X X       How to register for [employer]:  1. Go to  [email or web address].  2. Click on the Sign Up Now box, which takes you to the New Member Sign Up page. You will need to provide the following information:  a. Enter your [employer] Access Code exactly as it appears at the top of this page. (You will not need to use this code after you’ve completed the sign-up process. If you do not sign up before the expiration date, you must request a new code.)   b. Enter your date of birth.   c. Enter your home email address.   d. Click Submit, and follow the next screen’s instructions.  3. Create a [employer] ID. This will be your [employer] login ID and cannot be changed, so think of one that is secure and easy to remember.  4. Create a [employer] password. You can change your password at any time.  5. Enter your Password Reset Question and Answer. This can be used at a later time if you forget your password.   6. Enter your e-mail address. This allows you to receive e-mail notifications when new information is available in [employer].  7. Click Sign Up. You can now view your health information.    For assistance activating your [employer] account, call [phone]

## 2021-03-15 DIAGNOSIS — Z23 NEED FOR VACCINATION: ICD-10-CM

## 2021-04-14 ENCOUNTER — TELEMEDICINE (OUTPATIENT)
Dept: MEDICAL GROUP | Facility: MEDICAL CENTER | Age: 61
End: 2021-04-14

## 2021-04-14 VITALS — HEIGHT: 63 IN | TEMPERATURE: 97.3 F | BODY MASS INDEX: 31.89 KG/M2 | WEIGHT: 180 LBS

## 2021-04-14 DIAGNOSIS — F51.01 PRIMARY INSOMNIA: ICD-10-CM

## 2021-04-14 DIAGNOSIS — F33.0 MDD (MAJOR DEPRESSIVE DISORDER), RECURRENT EPISODE, MILD (HCC): ICD-10-CM

## 2021-04-14 DIAGNOSIS — F41.9 ANXIETY: ICD-10-CM

## 2021-04-14 PROCEDURE — 99203 OFFICE O/P NEW LOW 30 MIN: CPT | Performed by: INTERNAL MEDICINE

## 2021-04-14 RX ORDER — LORAZEPAM 1 MG/1
TABLET ORAL
COMMUNITY
Start: 2020-11-16 | End: 2021-04-14

## 2021-04-14 RX ORDER — ZOLPIDEM TARTRATE 12.5 MG/1
12.5 TABLET, FILM COATED, EXTENDED RELEASE ORAL NIGHTLY PRN
Qty: 30 TABLET | Refills: 5 | Status: SHIPPED | OUTPATIENT
Start: 2021-04-14 | End: 2021-10-11

## 2021-04-14 RX ORDER — LORAZEPAM 1 MG/1
1 TABLET ORAL 2 TIMES DAILY PRN
Qty: 60 TABLET | Refills: 5 | Status: SHIPPED | OUTPATIENT
Start: 2021-04-14 | End: 2021-10-11

## 2021-04-14 NOTE — PROGRESS NOTES
Virtual Visit: New Patient   This visit was conducted via Zoom using secure and encrypted videoconferencing technology. The patient was in a private location in the state of [place].    The patient's identity was confirmed and verbal consent was obtained for this virtual visit.    Subjective:     CC: Medication refill for anxiety and insomnia    Dinora Umana is a 60 y.o. 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  She goes back and forth from [place] to [place] a few times a month.  Her son currently lives here.    She has been on lorazepam 1 mg daily for years to come down racing thoughts.  She functions better and is able to think better when she is taking it.  She denies any current side effects.    She has history of depression, stable on fluoxetine.    She also have history of insomnia, occasionally use Ambien when it is severe.  She uses melatonin and ZzzQuil as alternatives.    ROS  See HPI       Allergies   Allergen Reactions   • Augmentin Hives and Rash   • Erythromycin Nausea   • Morphine Vomiting and Nausea       Current medicines (including changes today)  Current Outpatient Medications   Medication Sig Dispense Refill   • LORazepam (ATIVAN) 1 MG Tab Take 1 tablet by mouth 2 times a day as needed for Anxiety for up to 180 days. 60 tablet 5   • zolpidem (AMBIEN CR) 12.5 MG CR tablet Take 1 tablet by mouth at bedtime as needed for Sleep for up to 180 days. 30 tablet 5   • fluoxetine (PROZAC) 40 MG capsule Take 1 Cap by mouth every day. 90 Cap 3   • Multiple Vitamins-Minerals (MULTIVITAMIN PO) Take  by mouth every day.       No current facility-administered medications for this visit.       She  has a past medical history of Alcohol abuse, Allergy, Depression, Ex-smoker, Insomnia, Left wrist fracture (2013), and Thyrotoxicosis.  She  has a past surgical history that includes other;  "primary c section (1999, 2000); wrist orif (8/1/2013); and mammoplasty reduction (Bilateral, 9/29/2015).      Family History   Problem Relation Age of Onset   • Stroke Father    • Stroke Other    • Cancer Other      Family Status   Relation Name Status   • Mo  Alive   • Fa  Alive   • OTHER  (Not Specified)   • OTHER  (Not Specified)       Patient Active Problem List    Diagnosis Date Noted   • Obesity (BMI 30-39.9) 11/28/2016   • Bilateral hearing loss 12/07/2015   • Diverticulosis of colon 12/08/2014   • Hyperlipidemia 10/29/2014   • Seasonal allergies 04/21/2014   • Anxiety 01/21/2014   • Recovering alcoholic in remission (HCC) 01/21/2014   • MDD (major depressive disorder), recurrent episode, mild ([facility])    • Insomnia           Objective:   Temp 36.3 °C (97.3 °F) (Temporal)   Ht 1.6 m (5' 3\")   Wt 81.6 kg (180 lb)   BMI 31.89 kg/m²     Physical Exam:   Constitutional: Alert, no distress, well-groomed.  Skin: No rashes in visible areas.  Eye: Round. Conjunctiva clear, lids normal. No icterus.   ENMT: Lips pink without lesions,  moist mucous membranes. Phonation normal.  Neck: No visible masses or thyromegaly.    Respiratory: Unlabored respiratory effort, no cough or audible wheeze  Psych: Alert and oriented x3, normal affect and mood.       Assessment and Plan:   The following treatment plan was discussed:     1. Anxiety  - Discussed side effects of long-term benzodiazepine use such as memory loss, balance problem, fall risk.  Discussed about physical and psychological dependency.  She has history of alcohol dependency, currently in remission.  She regularly goes to  and has a counselor she regularly meets with.  -Discussed possible alternatives without long-term side effects like benzodiazepines.  We will discuss tapering lorazepam and using alternative such as BuSpar when she is ready.   - LORazepam (ATIVAN) 1 MG Tab; Take 1 tablet by mouth 2 times a day as needed for Anxiety for up to 180 days.  Dispense: " 60 tablet; Refill: 5    2. MDD (major depressive disorder), recurrent episode, mild (HCC)  -Stable.  Continue Prozac.    3. Primary insomnia  - zolpidem (AMBIEN CR) 12.5 MG CR tablet; Take 1 tablet by mouth at bedtime as needed for Sleep for up to 180 days.  Dispense: 30 tablet; Refill: 5        Follow-up: Return in about 6 months (around 10/14/2021).